# Patient Record
Sex: MALE | Race: WHITE | NOT HISPANIC OR LATINO
[De-identification: names, ages, dates, MRNs, and addresses within clinical notes are randomized per-mention and may not be internally consistent; named-entity substitution may affect disease eponyms.]

---

## 2020-01-23 ENCOUNTER — APPOINTMENT (OUTPATIENT)
Dept: UROLOGY | Facility: CLINIC | Age: 45
End: 2020-01-23
Payer: COMMERCIAL

## 2020-01-23 VITALS
TEMPERATURE: 98 F | DIASTOLIC BLOOD PRESSURE: 74 MMHG | RESPIRATION RATE: 16 BRPM | HEART RATE: 76 BPM | SYSTOLIC BLOOD PRESSURE: 124 MMHG

## 2020-01-23 PROCEDURE — 99205 OFFICE O/P NEW HI 60 MIN: CPT

## 2020-01-23 NOTE — REVIEW OF SYSTEMS
[Abdominal Pain] : abdominal pain [Constipation] : constipation [Loss of interest] : loss of interest in sexual activity [Poor quality erections] : Poor quality erections [Seen by urologist before (Name)  ___] : Preciously seen by a urologist: [unfilled] [Wake up at night to urinate  How many times?  ___] : wakes up to urinate [unfilled] times during the night [Strong urge to urinate] : strong urge to urinate [Anxiety] : anxiety [Slow urine stream] : slow urine stream [Depression] : depression [Negative] : Heme/Lymph

## 2020-01-24 LAB
APPEARANCE: CLEAR
BACTERIA: NEGATIVE
BILIRUBIN URINE: NEGATIVE
BLOOD URINE: NEGATIVE
COLOR: NORMAL
GLUCOSE QUALITATIVE U: NEGATIVE
HYALINE CASTS: 0 /LPF
KETONES URINE: NEGATIVE
LEUKOCYTE ESTERASE URINE: NEGATIVE
MICROSCOPIC-UA: NORMAL
NITRITE URINE: NEGATIVE
PH URINE: 6
PROTEIN URINE: NEGATIVE
RED BLOOD CELLS URINE: 1 /HPF
SPECIFIC GRAVITY URINE: 1.03
SQUAMOUS EPITHELIAL CELLS: 0 /HPF
UROBILINOGEN URINE: NORMAL
WHITE BLOOD CELLS URINE: 1 /HPF

## 2020-06-11 ENCOUNTER — APPOINTMENT (OUTPATIENT)
Dept: UROLOGY | Facility: CLINIC | Age: 45
End: 2020-06-11
Payer: COMMERCIAL

## 2020-06-11 VITALS — TEMPERATURE: 98.1 F

## 2020-06-11 DIAGNOSIS — N52.9 MALE ERECTILE DYSFUNCTION, UNSPECIFIED: ICD-10-CM

## 2020-06-11 DIAGNOSIS — N41.9 INFLAMMATORY DISEASE OF PROSTATE, UNSPECIFIED: ICD-10-CM

## 2020-06-11 DIAGNOSIS — R39.11 HESITANCY OF MICTURITION: ICD-10-CM

## 2020-06-11 PROCEDURE — 99215 OFFICE O/P EST HI 40 MIN: CPT

## 2020-06-12 LAB
APPEARANCE: CLEAR
BACTERIA: NEGATIVE
BILIRUBIN URINE: NEGATIVE
BLOOD URINE: NEGATIVE
COLOR: COLORLESS
GLUCOSE QUALITATIVE U: NEGATIVE
HYALINE CASTS: 0 /LPF
KETONES URINE: NEGATIVE
LEUKOCYTE ESTERASE URINE: NEGATIVE
MICROSCOPIC-UA: NORMAL
NITRITE URINE: NEGATIVE
PH URINE: 6
PROTEIN URINE: NEGATIVE
RED BLOOD CELLS URINE: 0 /HPF
SPECIFIC GRAVITY URINE: 1.01
SQUAMOUS EPITHELIAL CELLS: 0 /HPF
UROBILINOGEN URINE: NORMAL
WHITE BLOOD CELLS URINE: 0 /HPF

## 2020-06-15 LAB — BACTERIA UR CULT: NORMAL

## 2020-09-25 ENCOUNTER — APPOINTMENT (OUTPATIENT)
Dept: UROLOGY | Facility: CLINIC | Age: 45
End: 2020-09-25

## 2023-09-11 ENCOUNTER — APPOINTMENT (OUTPATIENT)
Dept: ORTHOPEDIC SURGERY | Facility: CLINIC | Age: 48
End: 2023-09-11
Payer: COMMERCIAL

## 2023-09-11 ENCOUNTER — NON-APPOINTMENT (OUTPATIENT)
Age: 48
End: 2023-09-11

## 2023-09-11 VITALS — HEIGHT: 69 IN | WEIGHT: 216 LBS | BODY MASS INDEX: 31.99 KG/M2

## 2023-09-11 DIAGNOSIS — S76.312A STRAIN OF MUSCLE, FASCIA AND TENDON OF THE POSTERIOR MUSCLE GROUP AT THIGH LEVEL, LEFT THIGH, INITIAL ENCOUNTER: ICD-10-CM

## 2023-09-11 DIAGNOSIS — S73.192A OTHER SPRAIN OF LEFT HIP, INITIAL ENCOUNTER: ICD-10-CM

## 2023-09-11 PROCEDURE — 73503 X-RAY EXAM HIP UNI 4/> VIEWS: CPT

## 2023-09-11 PROCEDURE — 99204 OFFICE O/P NEW MOD 45 MIN: CPT

## 2024-03-23 ENCOUNTER — NON-APPOINTMENT (OUTPATIENT)
Age: 49
End: 2024-03-23

## 2025-06-26 ENCOUNTER — EMERGENCY (EMERGENCY)
Facility: HOSPITAL | Age: 50
LOS: 1 days | End: 2025-06-26
Attending: EMERGENCY MEDICINE
Payer: COMMERCIAL

## 2025-06-26 VITALS
TEMPERATURE: 98 F | RESPIRATION RATE: 20 BRPM | WEIGHT: 199.96 LBS | SYSTOLIC BLOOD PRESSURE: 119 MMHG | HEART RATE: 75 BPM | OXYGEN SATURATION: 99 % | DIASTOLIC BLOOD PRESSURE: 70 MMHG | HEIGHT: 64 IN

## 2025-06-26 PROCEDURE — 99284 EMERGENCY DEPT VISIT MOD MDM: CPT | Mod: 25

## 2025-06-26 PROCEDURE — 96372 THER/PROPH/DIAG INJ SC/IM: CPT

## 2025-06-26 PROCEDURE — 99284 EMERGENCY DEPT VISIT MOD MDM: CPT

## 2025-06-26 RX ORDER — METHOCARBAMOL 500 MG/1
1500 TABLET, FILM COATED ORAL ONCE
Refills: 0 | Status: COMPLETED | OUTPATIENT
Start: 2025-06-26 | End: 2025-06-26

## 2025-06-26 RX ORDER — METHOCARBAMOL 500 MG/1
2 TABLET, FILM COATED ORAL
Qty: 18 | Refills: 0
Start: 2025-06-26 | End: 2025-06-28

## 2025-06-26 RX ORDER — IBUPROFEN 200 MG
1 TABLET ORAL
Qty: 20 | Refills: 0
Start: 2025-06-26 | End: 2025-06-30

## 2025-06-26 RX ORDER — OXYCODONE HYDROCHLORIDE 30 MG/1
1 TABLET ORAL
Qty: 3 | Refills: 0
Start: 2025-06-26 | End: 2025-06-28

## 2025-06-26 RX ORDER — DEXAMETHASONE 0.5 MG/1
10 TABLET ORAL ONCE
Refills: 0 | Status: COMPLETED | OUTPATIENT
Start: 2025-06-26 | End: 2025-06-26

## 2025-06-26 RX ORDER — LIDOCAINE HYDROCHLORIDE 20 MG/ML
1 JELLY TOPICAL ONCE
Refills: 0 | Status: COMPLETED | OUTPATIENT
Start: 2025-06-26 | End: 2025-06-26

## 2025-06-26 RX ORDER — KETOROLAC TROMETHAMINE 30 MG/ML
15 INJECTION, SOLUTION INTRAMUSCULAR; INTRAVENOUS ONCE
Refills: 0 | Status: DISCONTINUED | OUTPATIENT
Start: 2025-06-26 | End: 2025-06-26

## 2025-06-26 RX ORDER — OXYCODONE HYDROCHLORIDE 30 MG/1
5 TABLET ORAL ONCE
Refills: 0 | Status: DISCONTINUED | OUTPATIENT
Start: 2025-06-26 | End: 2025-06-26

## 2025-06-26 RX ADMIN — DEXAMETHASONE 10 MILLIGRAM(S): 0.5 TABLET ORAL at 19:42

## 2025-06-26 RX ADMIN — LIDOCAINE HYDROCHLORIDE 1 PATCH: 20 JELLY TOPICAL at 19:43

## 2025-06-26 RX ADMIN — KETOROLAC TROMETHAMINE 15 MILLIGRAM(S): 30 INJECTION, SOLUTION INTRAMUSCULAR; INTRAVENOUS at 19:43

## 2025-06-26 RX ADMIN — OXYCODONE HYDROCHLORIDE 5 MILLIGRAM(S): 30 TABLET ORAL at 20:55

## 2025-06-26 RX ADMIN — METHOCARBAMOL 1500 MILLIGRAM(S): 500 TABLET, FILM COATED ORAL at 19:43

## 2025-06-26 NOTE — ED PROVIDER NOTE - NSFOLLOWUPINSTRUCTIONS_ED_ALL_ED_FT
- Please bring all documentation from your ED visit to any related future follow up appointment.  - Please call to schedule follow up appointment with your primary care physician within 24-48 hours.  - Please seek immediate medical attention or return to the ED for any new/worsening, signs/symptoms, or concerns     Back Pain    Back pain is very common in adults. The cause of back pain is rarely dangerous and the pain often gets better over time. The cause of your back pain may not be known and may include strain of muscles or ligaments, degeneration of the spinal disks, or arthritis. Occasionally the pain may radiate down your leg(s). Over-the-counter medicines to reduce pain and inflammation are often the most helpful. Stretching and remaining active frequently helps the healing process.     SEEK IMMEDIATE MEDICAL CARE IF YOU HAVE ANY OF THE FOLLOWING SYMPTOMS: bowel or bladder control problems, unusual weakness or numbness in your arms or legs, nausea or vomiting, abdominal pain, fever, dizziness/lightheadedness.

## 2025-06-26 NOTE — ED ADULT TRIAGE NOTE - CHIEF COMPLAINT QUOTE
pt arrives to ED c/o lower back pain that radiates to his left leg, denies N/V/D/CP/SOB/fall/injury, Motrin with no relief, scheduled for injection on Tuesday

## 2025-06-26 NOTE — ED PROVIDER NOTE - OBJECTIVE STATEMENT
49yo M denies pmh presents to ED c/o L sided lower back pain that started  4d ago. pt reports pain progressed 2d ago after excessive walking during sons baseball game. pt reports pain worse with walking and standing from seated position. pt went to orthopedics who advised him that he has "SI joint inflammation" and scheduled him for joint injections this week. pt reports he took ibu today without much relief, last dose over 6h ago. pt notes numbness down his L thigh.

## 2025-06-26 NOTE — ED PROVIDER NOTE - PATIENT PORTAL LINK FT
You can access the FollowMyHealth Patient Portal offered by [facility] by registering at the following website: [email or web address]. By joining [facility]’s FollowMyHealth portal, you will also be able to view your health information using other applications (apps) compatible with our system.

## 2025-06-26 NOTE — ED ADULT NURSE NOTE - OBJECTIVE STATEMENT
Assumed care of patient in rw. Pt a&ox4 rr even and unlabored presents to ed with c/o left lower back pain radiating to L leg for a couple of days. Reports following up with his Orthopedic doctor for an injection on Tuesday. Denies saddle anesthesia, bowel/urinary incontinence, chest pain, sob, fever, chills. Pt educated on plan of care, pt able to successfully teach back plan of care to RN, RN will continue to reeducate pt during hospital stay.

## 2025-06-26 NOTE — ED ADULT NURSE NOTE - NSFALLRISKINTERV_ED_ALL_ED

## 2025-06-26 NOTE — ED PROVIDER NOTE - PHYSICAL EXAMINATION
General: non-toxic appearing, in some acute pain  CV: RRR, nl s1/s2.  Resp: CTAB, normal rate and effort  Neuro: sensorimotor intact without deficits   MSK: +L paraspinal ttp, Full ROM and strength ext x 4. ambulatory with discomfort  Skin: No rashes, lacerations, abrasions, ecchymosis, edema. intact and perfused.

## 2025-06-26 NOTE — ED PROVIDER NOTE - ATTENDING APP SHARED VISIT CONTRIBUTION OF CARE
49yo M p/w positional lower back pain with numbness to his L thigh x4d. following with orthopedics, scheduled for joint injections this week. on eval pt appeared well, in some acute pain, +L Paraspinal ttp, +SI joint ttp, ambulatory with discomfort, no focal deficits, FROM and strength of lower ext b/l, remainder of PE WNL. VSS. presentation c/w muscle strain. ordered decadron, toradol, robaxin and lidocaine patch. will reassess and send meds to pharm.      I, Cory Watts, performed the initial face to face bedside interview with this patient regarding history of present illness, review of symptoms and relevant past medical, social and family history.  I completed an independent physical examination.  I was the initial provider who evaluated this patient. I have signed out the follow up of any pending tests (i.e. labs, radiological studies) to the ACP.  I have communicated the patient’s plan of care and disposition with the ACP.

## 2025-06-26 NOTE — ED PROVIDER NOTE - CLINICAL SUMMARY MEDICAL DECISION MAKING FREE TEXT BOX
49yo M p/w positional lower back pain with numbness to his L thigh x4d. following with orthopedics, scheduled for joint injections this week. on eval pt appeared well, in some acute pain, +L Paraspinal ttp, +SI joint ttp, ambulatory with discomfort, no focal deficits, FROM and strength of lower ext b/l, remainder of PE WNL. VSS. presentation c/w muscle strain. ordered decadron, toradol, robaxin and lidocaine patch. will reassess and send meds to pharm.     discussed supportive care measures and return precautions. advised to f.u with pcp and ortho. pt verbalized understanding and agreement 51yo M p/w positional lower back pain with numbness to his L thigh x4d. following with orthopedics, scheduled for joint injections this week. on eval pt appeared well, in some acute pain, +L Paraspinal ttp, +SI joint ttp, ambulatory with discomfort, no focal deficits, FROM and strength of lower ext b/l, remainder of PE WNL. VSS. presentation c/w muscle strain. ordered decadron, toradol, robaxin and lidocaine patch. will reassess and send meds to pharm.     pt reported some improvement of sx. ambulatory in ED. discussed supportive care measures and return precautions. advised to f.u with pcp and ortho. pt verbalized understanding and agreement

## 2025-06-27 ENCOUNTER — EMERGENCY (EMERGENCY)
Facility: HOSPITAL | Age: 50
LOS: 1 days | End: 2025-06-27
Attending: STUDENT IN AN ORGANIZED HEALTH CARE EDUCATION/TRAINING PROGRAM
Payer: COMMERCIAL

## 2025-06-27 VITALS
TEMPERATURE: 98 F | HEART RATE: 74 BPM | HEIGHT: 64 IN | OXYGEN SATURATION: 98 % | SYSTOLIC BLOOD PRESSURE: 112 MMHG | RESPIRATION RATE: 18 BRPM | DIASTOLIC BLOOD PRESSURE: 69 MMHG | WEIGHT: 199.96 LBS

## 2025-06-27 VITALS
TEMPERATURE: 98 F | DIASTOLIC BLOOD PRESSURE: 63 MMHG | RESPIRATION RATE: 20 BRPM | OXYGEN SATURATION: 99 % | SYSTOLIC BLOOD PRESSURE: 113 MMHG | HEART RATE: 60 BPM

## 2025-06-27 PROCEDURE — 96376 TX/PRO/DX INJ SAME DRUG ADON: CPT

## 2025-06-27 PROCEDURE — 72148 MRI LUMBAR SPINE W/O DYE: CPT

## 2025-06-27 PROCEDURE — 72148 MRI LUMBAR SPINE W/O DYE: CPT | Mod: 26

## 2025-06-27 PROCEDURE — 96375 TX/PRO/DX INJ NEW DRUG ADDON: CPT

## 2025-06-27 PROCEDURE — 99284 EMERGENCY DEPT VISIT MOD MDM: CPT | Mod: 25

## 2025-06-27 PROCEDURE — 96374 THER/PROPH/DIAG INJ IV PUSH: CPT

## 2025-06-27 PROCEDURE — 99285 EMERGENCY DEPT VISIT HI MDM: CPT

## 2025-06-27 RX ORDER — OXYCODONE HYDROCHLORIDE AND ACETAMINOPHEN 10; 325 MG/1; MG/1
1 TABLET ORAL
Qty: 16 | Refills: 0
Start: 2025-06-27

## 2025-06-27 RX ORDER — OXYCODONE HYDROCHLORIDE 30 MG/1
10 TABLET ORAL ONCE
Refills: 0 | Status: DISCONTINUED | OUTPATIENT
Start: 2025-06-27 | End: 2025-06-27

## 2025-06-27 RX ORDER — CYCLOBENZAPRINE HYDROCHLORIDE 15 MG/1
10 CAPSULE, EXTENDED RELEASE ORAL ONCE
Refills: 0 | Status: COMPLETED | OUTPATIENT
Start: 2025-06-27 | End: 2025-06-27

## 2025-06-27 RX ORDER — KETOROLAC TROMETHAMINE 30 MG/ML
30 INJECTION, SOLUTION INTRAMUSCULAR; INTRAVENOUS ONCE
Refills: 0 | Status: DISCONTINUED | OUTPATIENT
Start: 2025-06-27 | End: 2025-06-27

## 2025-06-27 RX ORDER — LIDOCAINE HYDROCHLORIDE 20 MG/ML
1 JELLY TOPICAL ONCE
Refills: 0 | Status: COMPLETED | OUTPATIENT
Start: 2025-06-27 | End: 2025-06-27

## 2025-06-27 RX ADMIN — LIDOCAINE HYDROCHLORIDE 1 PATCH: 20 JELLY TOPICAL at 14:06

## 2025-06-27 RX ADMIN — Medication 4 MILLIGRAM(S): at 14:07

## 2025-06-27 RX ADMIN — KETOROLAC TROMETHAMINE 30 MILLIGRAM(S): 30 INJECTION, SOLUTION INTRAMUSCULAR; INTRAVENOUS at 14:06

## 2025-06-27 RX ADMIN — Medication 4 MILLIGRAM(S): at 15:50

## 2025-06-27 RX ADMIN — CYCLOBENZAPRINE HYDROCHLORIDE 10 MILLIGRAM(S): 15 CAPSULE, EXTENDED RELEASE ORAL at 14:06

## 2025-06-27 NOTE — ED ADULT NURSE NOTE - NS ED NURSE RECORD ANOTHER VITAL SIGN
Good mornign! Teodoro's labs are overall great!  His blood counts, thyroid, blood sugar, lipid (cholesterol) levels.  No concerns for anemia.     He had slightly low Vitamin D levels.  I recommend taking Vitamin D 2000 units every day. We can recheck his levels in 3-6 months to see how it is.  It is only barely low so I imagine it will improve significantly. Yes

## 2025-06-27 NOTE — ED PROVIDER NOTE - CLINICAL SUMMARY MEDICAL DECISION MAKING FREE TEXT BOX
mri reviewed. neurosurgery consult appredciated. PT already has steroids at home.  Pt feeling better after second dose of morphine.  Pt reassured and instructed to f/up with his SHERMAN as scheduled for tuesday and subsequently see dr. mg.  pt instructed to return for worsening pain, bowel/bladder dysfunction. weakness, or any other concerns.  Pt given a copy of all results and instructed to f/up with pcp regarding any abnormal results.

## 2025-06-27 NOTE — CONSULT NOTE ADULT - ASSESSMENT
HPI: Pt is a 50 yoM with no known PMHX  returns with complaints of lower back pain. He presented to ED just yesterday with the same complaints which started several days ago. Pt states that he followed up with an orthopedist who arranged for him to have an SHERMAN on July 1st. Pt came last night for worsening back pain and was given oxycodone, steroids, nsaids, muscle relaxant and discharged. Pt states that today he took Motrin and oxycodone without relief. In ED MRI shows  advanced degenerative disc disease at T11-T12, L3-L4, L4-L5   and L5-S1, Neurosuregery consulted for further recommendations. Patient seen and examined in no acute distress,  Pt states pt reports pain worse with walking and standing from seated position. He denies weakness in his legs, no bowel/bladder dysfunction. no fever, no recent trauma or fall.      pain control  steroids   patient scheduled for tomorrow EMJ tomorrow and SHERMAN 7/1  f/u with primary Pain Management   no further sx intervention at the moment   INCOMPLETE*******      Case to be d/w Dr. 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 Velasquez 2005) returns with complaints of lower back stiffness, left groin and quad pain with intermittent left knee/thigh numbness tingling.   Strong on exam and intact except decreased sensation on left calf.    Plan:   -pain control  -medrol dose janet   -patient scheduled for tomorrow EMG tomorrow and SHERMAN with Dr. Villasenor 7/1  -f/u with primary Pain Management doctor  -no neurosurgical intervention at this time  -f/u neurosurgeon (Dr. Velasquez) after SHERMAN    Case to be d/w Dr. Burgess and Dr. 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 Velasquez 2005) returns with complaints of lower back stiffness, left groin and quad pain with intermittent left knee/thigh numbness tingling.   Strong on exam and intact except decreased sensation on left calf.    Plan:   -pain control  -medrol dose janet   -patient scheduled for tomorrow EMG tomorrow and SHERMAN with Dr. Villasenor 7/1  -f/u with primary Pain Management doctor  -no neurosurgical intervention at this time  -f/u neurosurgeon (Dr. Velasquez) after SHERMAN, signing off. Recall as needed    Case to be d/w Dr. Burgess and Dr. 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 Velasquez 2005) returns with complaints of lower back stiffness, left groin and quad pain with intermittent left knee/thigh numbness tingling.   Strong on exam and intact except decreased sensation on left calf.    Plan:   -pain control  -medrol dose janet   -patient scheduled for tomorrow EMG tomorrow and SHERMAN with Dr. Villasenor 7/1  -f/u with primary Pain Management doctor  -no neurosurgical intervention at this time  -f/u neurosurgeon (Dr. Velasquez or Dr. Burgess) after SHERMAN, signing off. Recall as needed    Case to be d/w Dr. Burgess and Dr. Velasquez

## 2025-06-27 NOTE — ED PROVIDER NOTE - OBJECTIVE STATEMENT
Pt is a 50 yoi M co low back pain.  Pt was here last night for the same. Pt states that the pain started several days ago.  Pt states that he followed up with an orthopedist who arranged for him to have an SHERMAN on july 1st. Pt came last night for worsening back pain and was given oxycodone, steroids, nsaids, muscle relaxant and discharged. Pt states that today he took motrin and oxycodone without relief.  Pt states that the numbness to his L thigh is intermittent. no weakness in his legs, just pain into his L leg.  no bowel/bladder dysfunction. no fever.

## 2025-06-27 NOTE — ED PROVIDER NOTE - PHYSICAL EXAMINATION
Constitutional - well-developed.   Head - NCAT. Airway patent.   Eyes - PERRL.   CV - RRR. no murmur. no edema.   Pulm - CTAB.   Abd - soft, nt. no rebound. no guarding.   Neuro - A&Ox3. strength 5/5 x4. sensation intact x4.   Skin - No rash. .  MSK - normal ROM.

## 2025-06-27 NOTE — ED ADULT NURSE NOTE - NSFALLRISKINTERV_ED_ALL_ED

## 2025-06-27 NOTE — ED PROVIDER NOTE - NS ED MD DISPO DISCHARGE CCDA
Stress test and echo scheduled 4/11/2024. Postponing until 4/12/2024 to follow up and forward update to Fidel Ray NP, prior to procedure. Patient/Caregiver provided printed discharge information.

## 2025-06-27 NOTE — ED ADULT TRIAGE NOTE - CHIEF COMPLAINT QUOTE
Pt BIBA from home, seen in ED last night for same, c/o 10/10 back/groin pain radiating down left leg, states PO meds prescribed not helping with pain, given 100mcg fentanyl IV and 15mg toradol PTA

## 2025-06-27 NOTE — CONSULT NOTE ADULT - SUBJECTIVE AND OBJECTIVE BOX
The patient is a 50y Male complaining of back pain general.    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 Velasquez 2005)  returns with complaints of lower back pain. He presented to ED just yesterday with the same complaints which started several days ago. Pt states that he followed up with an orthopedist who arranged for him to have an SHERMAN on July 1st. Pt came last night for worsening back pain and was given oxycodone, steroids, nsaids, muscle relaxant and discharged. Pt states that today he took Motrin and oxycodone without relief. In ED MRI shows advanced degenerative disc disease at T11-T12, L3-L4, L4-L5 L5-S1, patient was given Morphine 4 mg IVP and Toradol injection and reports mild improvement. Neurosurgery consulted for further recommendations. Patient seen and examined in no acute distress,  Pt states pt reports pain worse with walking and standing from seated position with numbness and tingling in his left knees. He denies weakness in his legs, no bowel/bladder dysfunction. no fever, no recent trauma or fall.       PAST MEDICAL & SURGICAL HISTORY:    FAMILY HISTORY:      SOCIAL HISTORY:  Tobacco Use:  EtOH use:   Substance:    Allergies    No Known Allergies    Intolerances        REVIEW OF SYSTEMS  Negative except as noted in HPI  CONSTITUTIONAL: No fever, weight loss, or fatigue  EYES: No eye pain, visual disturbances, or discharge  ENMT:  No difficulty hearing, tinnitus, vertigo; No sinus or throat pain  NECK: No pain or stiffness  RESPIRATORY: No cough, wheezing, chills or hemoptysis; No shortness of breath  CARDIOVASCULAR: No chest pain, palpitations, dizziness, or leg swelling  GASTROINTESTINAL: No abdominal or epigastric pain. No nausea, vomiting, or hematemesis; No diarrhea or constipation. No melena or hematochezia.  GENITOURINARY: No dysuria, frequency, hematuria, or incontinence  NEUROLOGICAL: No headaches, memory loss, loss of strength, numbness, or tremors  MUSCULOSKELETAL: No joint pain or swelling; No muscle, back, or extremity pain    HOME MEDICATIONS:  Home Medications:      MEDICATIONS:  Antibiotics:    Neuro:    Anticoagulation:    OTHER:      Vital Signs Last 24 Hrs  T(C): 36.4 (27 Jun 2025 13:07), Max: 36.4 (26 Jun 2025 17:55)  T(F): 97.6 (27 Jun 2025 13:07), Max: 97.6 (27 Jun 2025 13:07)  HR: 74 (27 Jun 2025 13:07) (74 - 75)  BP: 112/69 (27 Jun 2025 13:07) (112/69 - 119/70)  BP(mean): --  RR: 18 (27 Jun 2025 13:07) (18 - 20)  SpO2: 98% (27 Jun 2025 13:07) (98% - 99%)    Parameters below as of 27 Jun 2025 13:07  Patient On (Oxygen Delivery Method): room air          Physical Exam:  Constitutional: NAD, lying in bed  Neuro  * Mental Status:  GCS 15: Awake, alert, oriented to conversation. No aphasia or difficulty speaking. No dysarthria.  * Cranial Nerves: Cnii-Cnxii grossly intact. PERRL, EOMI, tongue midline, no gaze deviation  * Motor: RUE 5/5, LUE 5/5, RLE 5/5, LLE 5/5, no drift or dysmetria  * Sensory: Sensation intact to light touch  * Reflexes: not assessed   Cardiovascular: Regular rate and rhythm.  Eyes: See neurologic examination with detailed examination of eyes.  ENT: No JVD, Trachea Midline  Respiratory: non labored breathing   Gastrointestinal: Soft, nontender, nondistended.  Genitourinary: [ ] Salas, [ x ] No Salas.   Musculoskeletal: No muscle wasting noted, (See neurologic assessment for full muscle strength assessment) No pretibial edema appreciated, no appreciable calf tenderness.  Skin:  no wounds, no redness, no abrasions noted  Hematologic / Lymph / Immunologic: No bleeding from IV sites or wounds, No lymphadenopathy, No Hives or allergic type skin lesions    LABS:      RADIOLOGY, EKG & ADDITIONAL TESTS: Reviewed.     MR Lumbar Spine No Cont (06.27.25 @ 16:13) >  IMPRESSION:  Advanceddegenerative disc disease at T11-T12, L3-L4, L4-L5   and L5-S1     NEUROSURGERY PA CONSULT NOTE  The patient is a 50y Male complaining of back pain general.    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 Velasquez 2005)  returns with complaints of lower back pain. He presented to ED just yesterday with the same complaints which started several days ago. Pt states that he followed up with an orthopedist who arranged for him to have an SHERMAN on July 1st. Pt came last night for worsening back pain and was given oxycodone, steroids, nsaids, muscle relaxant and discharged. Pt states that today he took Motrin and oxycodone without relief. In ED MRI shows advanced degenerative disc disease at T11-T12, L3-L4, L4-L5 L5-S1, patient was given Morphine 4 mg IVP and Toradol injection and reports mild improvement. Neurosurgery consulted for further recommendations. Patient seen and examined in no acute distress,  Pt states pt reports pain worse with walking and standing from seated position with numbness and tingling in his left knees. He denies weakness in his legs, no bowel/bladder dysfunction. no fever, no recent trauma or fall.       PAST MEDICAL & SURGICAL HISTORY:  hx of C6-7 ACDF Dr. Rodríguez 2003  C5-6foraminotomy with Dr. Velasquez in 2005      SOCIAL HISTORY:  Tobacco Use: denies  EtOH use: 1 drink a week  Substance: denies    Allergies  No Known Allergies  Intolerances    REVIEW OF SYSTEMS  Negative except as noted in HPI  CONSTITUTIONAL: No fever, weight loss, or fatigue  EYES: No eye pain, visual disturbances, or discharge  ENMT:  No difficulty hearing, tinnitus, vertigo; No sinus or throat pain  NECK: No pain or stiffness  RESPIRATORY: No cough, wheezing, chills or hemoptysis; No shortness of breath  CARDIOVASCULAR: No chest pain, palpitations, dizziness, or leg swelling  GASTROINTESTINAL: No abdominal or epigastric pain. No nausea, vomiting, or hematemesis; No diarrhea or constipation. No melena or hematochezia.  GENITOURINARY: No dysuria, frequency, hematuria, or incontinence  NEUROLOGICAL: No headaches, memory loss, loss of strength, numbness, or tremors  MUSCULOSKELETAL: No joint pain or swelling; No muscle, back, or extremity pain    Vital Signs Last 24 Hrs  T(C): 36.4 (27 Jun 2025 13:07), Max: 36.4 (26 Jun 2025 17:55)  T(F): 97.6 (27 Jun 2025 13:07), Max: 97.6 (27 Jun 2025 13:07)  HR: 74 (27 Jun 2025 13:07) (74 - 75)  BP: 112/69 (27 Jun 2025 13:07) (112/69 - 119/70)  BP(mean): --  RR: 18 (27 Jun 2025 13:07) (18 - 20)  SpO2: 98% (27 Jun 2025 13:07) (98% - 99%)  Parameters below as of 27 Jun 2025 13:07  Patient On (Oxygen Delivery Method): room air    Physical Exam:  Constitutional: NAD, lying in bed  Neuro  * Mental Status:  GCS 15: Awake, alert, oriented to conversation. No aphasia or difficulty speaking. No dysarthria.  * Cranial Nerves: Cnii-Cnxii grossly intact. PERRL, EOMI, tongue midline, no gaze deviation  * Motor: RUE 5/5, LUE 5/5, RLE 5/5, LLE 5/5, no drift or dysmetria  * Sensory: Sensation intact to light touch  * Reflexes: not assessed   Cardiovascular: Regular rate and rhythm.  Eyes: See neurologic examination with detailed examination of eyes.  ENT: No JVD, Trachea Midline  Respiratory: non labored breathing   Gastrointestinal: Soft, nontender, nondistended.  Genitourinary: [ ] Salas, [ x ] No Salas.   Musculoskeletal: No muscle wasting noted, (See neurologic assessment for full muscle strength assessment) No pretibial edema appreciated, no appreciable calf tenderness.  Skin:  no wounds, no redness, no abrasions noted  Hematologic / Lymph / Immunologic: No bleeding from IV sites or wounds, No lymphadenopathy, No Hives or allergic type skin lesions    LABS:      RADIOLOGY, EKG & ADDITIONAL TESTS: Reviewed.     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 Velasquez 2005)  returns with complaints of lower back pain/stiffness. Pt has been following pain management (Dr. Villasenor, last seen this Wednesday). Has been getting medications, and PT but experienced worsened pain since yesterday which brought him to the ED on the 26th. Denies trauma or inciting event. He received pain medication, steroid shot and sent home. Pain returned and represented to ED today. Described left sided back stiffness, left groin and quad pain with intermittent numbness tingling in left knee area. Left leg and UEs without complaints. Pain worse with walking, feels like his left leg mina at times. Denies saddle anesthesia, change in bowel or bladder habits.    Of note: pt has hx of increased urinary frequency, issues with urine stream, and erectile dysfunction for 1 year. Saw urologist (Dr. Aiken) on Thursday, who told pt that urinary symptoms were related to SI joint issues. Bladder issues have not changed since the pain has increased.    *Pt has EMG study tomorrow already scheduled and an SHERMAN with Dr. Villasenor on Tuesday     PAST MEDICAL & SURGICAL HISTORY:  hx of C6-7 ACDF Dr. Rodríguez 2003  C5-6foraminotomy with Dr. Velasquez in 2005    SOCIAL HISTORY:  Tobacco Use: denies  EtOH use: 1 drink a week  Substance: denies    Allergies  No Known Allergies  Intolerances    Vital Signs Last 24 Hrs  T(C): 36.4 (27 Jun 2025 13:07), Max: 36.4 (26 Jun 2025 17:55)  T(F): 97.6 (27 Jun 2025 13:07), Max: 97.6 (27 Jun 2025 13:07)  HR: 74 (27 Jun 2025 13:07) (74 - 75)  BP: 112/69 (27 Jun 2025 13:07) (112/69 - 119/70)  BP(mean): --  RR: 18 (27 Jun 2025 13:07) (18 - 20)  SpO2: 98% (27 Jun 2025 13:07) (98% - 99%)  Parameters below as of 27 Jun 2025 13:07  Patient On (Oxygen Delivery Method): room air    Physical Exam:  Constitutional: NAD, lying in bed  Neuro  * Mental Status:  GCS 15: Awake, alert, oriented to conversation. No aphasia or difficulty speaking. No dysarthria.  * Cranial Nerves: Cnii-Cnxii grossly intact. PERRL, EOMI, tongue midline, no gaze deviation  * Motor: RUE 5/5, LUE 5/5, RLE 5/5, LLE 5/5  * Sensory: Sensation intact to light touch except subjective decreased sensation to left medial calf and shin.   * Reflexes: neg clonus, neg hoffmans reflex    LABS:  noen to review    RADIOLOGY, EKG & ADDITIONAL TESTS: Reviewed.   MR Lumbar Spine No Cont (06.27.25 @ 16:13) >  IMPRESSION:  Advanceddegenerative disc disease at T11-T12, L3-L4, L4-L5   and L5-S1

## 2025-06-27 NOTE — ED ADULT NURSE REASSESSMENT NOTE - NS ED NURSE REASSESS COMMENT FT1
patient pending Neurology .
pain meds given, pt resting comfortably showing no signs of respiratory distress or pain, pt is calm and cooperative

## 2025-06-27 NOTE — ED PROVIDER NOTE - PATIENT PORTAL LINK FT
You can access the FollowMyHealth Patient Portal offered by [facility] by registering at the following website: [email or web address]. By joining [facility]’s FollowMyHealth portal, you will also be able to view your health information using other applications (apps) compatible with our system.

## 2025-06-27 NOTE — ED ADULT NURSE NOTE - OBJECTIVE STATEMENT
Assumed care of pt at 1359. Pt A&Ox4 c/o back pain that radiates to his legs, was here yesterday for same complaint, denies CP/SOB, pt showing no signs of respiratory distress, pt is calm and cooperative

## 2025-07-01 ENCOUNTER — EMERGENCY (EMERGENCY)
Facility: HOSPITAL | Age: 50
LOS: 1 days | End: 2025-07-01
Attending: EMERGENCY MEDICINE
Payer: COMMERCIAL

## 2025-07-01 VITALS
DIASTOLIC BLOOD PRESSURE: 73 MMHG | HEART RATE: 76 BPM | TEMPERATURE: 98 F | SYSTOLIC BLOOD PRESSURE: 122 MMHG | WEIGHT: 199.96 LBS | OXYGEN SATURATION: 98 % | HEIGHT: 64 IN | RESPIRATION RATE: 18 BRPM

## 2025-07-01 LAB
ALBUMIN SERPL ELPH-MCNC: 3.8 G/DL — SIGNIFICANT CHANGE UP (ref 3.3–5.2)
ALP SERPL-CCNC: 54 U/L — SIGNIFICANT CHANGE UP (ref 40–120)
ALT FLD-CCNC: 20 U/L — SIGNIFICANT CHANGE UP
ANION GAP SERPL CALC-SCNC: 7 MMOL/L — SIGNIFICANT CHANGE UP (ref 5–17)
AST SERPL-CCNC: 19 U/L — SIGNIFICANT CHANGE UP
BASOPHILS # BLD AUTO: 0.05 K/UL — SIGNIFICANT CHANGE UP (ref 0–0.2)
BASOPHILS NFR BLD AUTO: 0.6 % — SIGNIFICANT CHANGE UP (ref 0–2)
BILIRUB SERPL-MCNC: 0.3 MG/DL — LOW (ref 0.4–2)
BUN SERPL-MCNC: 25.3 MG/DL — HIGH (ref 8–20)
CALCIUM SERPL-MCNC: 8.8 MG/DL — SIGNIFICANT CHANGE UP (ref 8.4–10.5)
CHLORIDE SERPL-SCNC: 105 MMOL/L — SIGNIFICANT CHANGE UP (ref 96–108)
CO2 SERPL-SCNC: 24 MMOL/L — SIGNIFICANT CHANGE UP (ref 22–29)
CREAT SERPL-MCNC: 0.84 MG/DL — SIGNIFICANT CHANGE UP (ref 0.5–1.3)
EGFR: 107 ML/MIN/1.73M2 — SIGNIFICANT CHANGE UP
EGFR: 107 ML/MIN/1.73M2 — SIGNIFICANT CHANGE UP
EOSINOPHIL # BLD AUTO: 0.11 K/UL — SIGNIFICANT CHANGE UP (ref 0–0.5)
EOSINOPHIL NFR BLD AUTO: 1.2 % — SIGNIFICANT CHANGE UP (ref 0–6)
GLUCOSE SERPL-MCNC: 100 MG/DL — HIGH (ref 70–99)
HCT VFR BLD CALC: 42.3 % — SIGNIFICANT CHANGE UP (ref 39–50)
HGB BLD-MCNC: 14 G/DL — SIGNIFICANT CHANGE UP (ref 13–17)
IMM GRANULOCYTES # BLD AUTO: 0.06 K/UL — SIGNIFICANT CHANGE UP (ref 0–0.07)
IMM GRANULOCYTES NFR BLD AUTO: 0.7 % — SIGNIFICANT CHANGE UP (ref 0–0.9)
LYMPHOCYTES # BLD AUTO: 1.55 K/UL — SIGNIFICANT CHANGE UP (ref 1–3.3)
LYMPHOCYTES NFR BLD AUTO: 17.5 % — SIGNIFICANT CHANGE UP (ref 13–44)
MCHC RBC-ENTMCNC: 28.4 PG — SIGNIFICANT CHANGE UP (ref 27–34)
MCHC RBC-ENTMCNC: 33.1 G/DL — SIGNIFICANT CHANGE UP (ref 32–36)
MCV RBC AUTO: 85.8 FL — SIGNIFICANT CHANGE UP (ref 80–100)
MONOCYTES # BLD AUTO: 0.66 K/UL — SIGNIFICANT CHANGE UP (ref 0–0.9)
MONOCYTES NFR BLD AUTO: 7.4 % — SIGNIFICANT CHANGE UP (ref 2–14)
NEUTROPHILS # BLD AUTO: 6.43 K/UL — SIGNIFICANT CHANGE UP (ref 1.8–7.4)
NEUTROPHILS NFR BLD AUTO: 72.6 % — SIGNIFICANT CHANGE UP (ref 43–77)
NRBC # BLD AUTO: 0 K/UL — SIGNIFICANT CHANGE UP (ref 0–0)
NRBC # FLD: 0 K/UL — SIGNIFICANT CHANGE UP (ref 0–0)
NRBC BLD AUTO-RTO: 0 /100 WBCS — SIGNIFICANT CHANGE UP (ref 0–0)
PLATELET # BLD AUTO: 227 K/UL — SIGNIFICANT CHANGE UP (ref 150–400)
PMV BLD: 10 FL — SIGNIFICANT CHANGE UP (ref 7–13)
POTASSIUM SERPL-MCNC: 4.3 MMOL/L — SIGNIFICANT CHANGE UP (ref 3.5–5.3)
POTASSIUM SERPL-SCNC: 4.3 MMOL/L — SIGNIFICANT CHANGE UP (ref 3.5–5.3)
PROT SERPL-MCNC: 6.5 G/DL — LOW (ref 6.6–8.7)
RBC # BLD: 4.93 M/UL — SIGNIFICANT CHANGE UP (ref 4.2–5.8)
RBC # FLD: 13 % — SIGNIFICANT CHANGE UP (ref 10.3–14.5)
SODIUM SERPL-SCNC: 136 MMOL/L — SIGNIFICANT CHANGE UP (ref 135–145)
WBC # BLD: 8.86 K/UL — SIGNIFICANT CHANGE UP (ref 3.8–10.5)
WBC # FLD AUTO: 8.86 K/UL — SIGNIFICANT CHANGE UP (ref 3.8–10.5)

## 2025-07-01 PROCEDURE — 36415 COLL VENOUS BLD VENIPUNCTURE: CPT

## 2025-07-01 PROCEDURE — 85025 COMPLETE CBC W/AUTO DIFF WBC: CPT

## 2025-07-01 PROCEDURE — 99223 1ST HOSP IP/OBS HIGH 75: CPT

## 2025-07-01 PROCEDURE — 99254 IP/OBS CNSLTJ NEW/EST MOD 60: CPT

## 2025-07-01 PROCEDURE — 80053 COMPREHEN METABOLIC PANEL: CPT

## 2025-07-01 RX ORDER — METHOCARBAMOL 500 MG/1
1500 TABLET, FILM COATED ORAL THREE TIMES A DAY
Refills: 0 | Status: DISCONTINUED | OUTPATIENT
Start: 2025-07-01 | End: 2025-07-08

## 2025-07-01 RX ORDER — DEXAMETHASONE 0.5 MG/1
10 TABLET ORAL ONCE
Refills: 0 | Status: COMPLETED | OUTPATIENT
Start: 2025-07-01 | End: 2025-07-01

## 2025-07-01 RX ORDER — KETOROLAC TROMETHAMINE 30 MG/ML
30 INJECTION, SOLUTION INTRAMUSCULAR; INTRAVENOUS ONCE
Refills: 0 | Status: DISCONTINUED | OUTPATIENT
Start: 2025-07-01 | End: 2025-07-01

## 2025-07-01 RX ORDER — METHOCARBAMOL 500 MG/1
1500 TABLET, FILM COATED ORAL ONCE
Refills: 0 | Status: COMPLETED | OUTPATIENT
Start: 2025-07-01 | End: 2025-07-01

## 2025-07-01 RX ORDER — IBUPROFEN 200 MG
600 TABLET ORAL EVERY 6 HOURS
Refills: 0 | Status: DISCONTINUED | OUTPATIENT
Start: 2025-07-01 | End: 2025-07-08

## 2025-07-01 RX ORDER — ACETAMINOPHEN 500 MG/5ML
1000 LIQUID (ML) ORAL ONCE
Refills: 0 | Status: COMPLETED | OUTPATIENT
Start: 2025-07-01 | End: 2025-07-01

## 2025-07-01 RX ORDER — OXYCODONE HYDROCHLORIDE 30 MG/1
5 TABLET ORAL
Refills: 0 | Status: COMPLETED | OUTPATIENT
Start: 2025-07-01 | End: 2025-07-08

## 2025-07-01 RX ORDER — ACETAMINOPHEN 500 MG/5ML
975 LIQUID (ML) ORAL EVERY 6 HOURS
Refills: 0 | Status: DISCONTINUED | OUTPATIENT
Start: 2025-07-01 | End: 2025-07-08

## 2025-07-01 RX ADMIN — KETOROLAC TROMETHAMINE 30 MILLIGRAM(S): 30 INJECTION, SOLUTION INTRAMUSCULAR; INTRAVENOUS at 12:18

## 2025-07-01 RX ADMIN — DEXAMETHASONE 102 MILLIGRAM(S): 0.5 TABLET ORAL at 21:50

## 2025-07-01 RX ADMIN — METHOCARBAMOL 1500 MILLIGRAM(S): 500 TABLET, FILM COATED ORAL at 21:43

## 2025-07-01 RX ADMIN — Medication 600 MILLIGRAM(S): at 20:31

## 2025-07-01 RX ADMIN — METHOCARBAMOL 1500 MILLIGRAM(S): 500 TABLET, FILM COATED ORAL at 12:19

## 2025-07-01 RX ADMIN — Medication 4 MILLIGRAM(S): at 12:19

## 2025-07-01 RX ADMIN — OXYCODONE HYDROCHLORIDE 5 MILLIGRAM(S): 30 TABLET ORAL at 20:31

## 2025-07-01 RX ADMIN — Medication 400 MILLIGRAM(S): at 12:19

## 2025-07-01 RX ADMIN — Medication 975 MILLIGRAM(S): at 20:32

## 2025-07-01 NOTE — PROGRESS NOTE ADULT - SUBJECTIVE AND OBJECTIVE BOX
Patient is a 50y old  Male who presents with a chief complaint of     HPI:      + - LOC   + - trauma   denied Headache   pt c/o + -headache  /10 on the pain scale   + - Nausea /+ - Vomiting  Right Left hand dominant   admits denies new/ worsening weakness  admits denies new/ worsening paresthesias/ numbness   admits denies  new/ worsening visual changes  admits denies C- Spine pain, + - collar/ cleared by trauma   admits denies T/LS  Spine pain, + - brace   admits denies Bowel/ Bladder dysfunction           PAST MEDICAL & SURGICAL HISTORY:    SOCIAL HISTORY:     FAMILY HISTORY: non-pertinent at this time        ROS:  CONSTITUTIONAL: No fever, weight loss, or fatigue  EYES: No eye pain, visual disturbances, or discharge  ENMT:  No difficulty hearing, tinnitus, vertigo; No sinus or throat pain  NECK: No pain or stiffness  RESPIRATORY: No cough, wheezing, chills or hemoptysis; No shortness of breath  CARDIOVASCULAR: No chest pain, palpitations, dizziness, or leg swelling  GASTROINTESTINAL: No abdominal or epigastric pain. No nausea, vomiting, or hematemesis; No diarrhea or constipation. No melena or hematochezia.  GENITOURINARY: No dysuria, frequency, hematuria, or incontinence  NEUROLOGICAL: No headaches, memory loss, loss of strength, numbness, or tremors  SKIN: No itching, burning, rashes, or lesions   MUSCULOSKELETAL: No joint pain or swelling; No muscle, back, or extremity pain        MEDICATIONS  (STANDING):    MEDICATIONS  (PRN):    Allergies  No Known Allergies    Intolerances      Vital Signs Last 24 Hrs  T(C): 36.7 (01 Jul 2025 10:29), Max: 36.7 (01 Jul 2025 10:29)  T(F): 98 (01 Jul 2025 10:29), Max: 98 (01 Jul 2025 10:29)  HR: 76 (01 Jul 2025 10:29) (76 - 76)  BP: 122/73 (01 Jul 2025 10:29) (122/73 - 122/73)  BP(mean): --  RR: 18 (01 Jul 2025 10:29) (18 - 18)  SpO2: 98% (01 Jul 2025 10:29) (98% - 98%)    Parameters below as of 01 Jul 2025 10:29  Patient On (Oxygen Delivery Method): room air        PHYSICAL EXAM:  GENERAL: NAD, well-groomed, well-developed, AAOx3 and very cooperative  HEAD:  Atraumatic, Normocephalic, no palpable step-off appreciated on palpation  EYES: b/l EOMI, PERRL, conjunctiva and sclera clear,   NECK: Supple, nontender to palpation  + FROM w no muscular soreness reported, neg B/B/K signs.  TS/LS: nontender to palpation midline or paraspinal muscles b/l, no pinpoint tenderness appreciated or paraspinal mm tenderness elicited on palpation b/l   NERVOUS SYSTEM:  Alert & Oriented X3, speech is clear and fluent, no dysarthria appreciated. Good concentration & very cooperative; Motor Strength 5/5 B/L upper and lower extremities, sensory is at baseline and symmetric b/l; No pronators or ankle clonus appreciated b/l, cerebellar signs grossly intact b/l. CN II-XII grossly intact b/l and symmetric; Spurling's test negative b/l; no bains's b/l appreciated.   EXTREMITIES:  2+ Peripheral Pulses, No clubbing, cyanosis, or edema, Hawkin's test negative b/l.   CHEST/LUNG: Clear to auscultation bilaterally; No rales, rhonchi, wheezing, or rubs  HEART: Regular rate and rhythm; +S1 & +S2  ABDOMEN: Soft, Nontender, Nondistended; Bowel sounds present  LYMPH: No lymphadenopathy noted  SKIN: No rashes or lesions                          14.0   8.86  )-----------( 227      ( 01 Jul 2025 12:22 )             42.3     07-01    136  |  105  |  25.3[H]  ----------------------------<  100[H]  4.3   |  24.0  |  0.84    Ca    8.8      01 Jul 2025 12:22    TPro  6.5[L]  /  Alb  3.8  /  TBili  0.3[L]  /  DBili  x   /  AST  19  /  ALT  20  /  AlkPhos  54  07-01      Urinalysis Basic - ( 01 Jul 2025 12:22 )    Color: x / Appearance: x / SG: x / pH: x  Gluc: 100 mg/dL / Ketone: x  / Bili: x / Urobili: x   Blood: x / Protein: x / Nitrite: x   Leuk Esterase: x / RBC: x / WBC x   Sq Epi: x / Non Sq Epi: x / Bacteria: x      LIVER FUNCTIONS - ( 01 Jul 2025 12:22 )  Alb: 3.8 g/dL / Pro: 6.5 g/dL / ALK PHOS: 54 U/L / ALT: 20 U/L / AST: 19 U/L / GGT: x               RADIOLOGY & ADDITIONAL STUDIES:  no new NSx images available for review   < from: MR Lumbar Spine No Cont (06.27.25 @ 16:13) >  ACC: [id number] EXAM:  MR SPINE LUMBAR   ORDERED BY: BARI LOPEZ     PROCEDURE DATE:  06/27/2025    INTERPRETATION:  MR lumbar spine without gadolinium contrast    CLINICAL INFORMATION:    low back pain  XMR  ADDITIONAL CLINICAL INFORMATION:   Spondylolisthesis M43.16    TECHNIQUE:   Sagittal and axial T2-weighted images, sagittal and coronal T1-weighted and sagittal STIR images of the lumbar spine were obtained.  CONTRAST AGENT:    NONE    COMPARISON:    No relevant prior imaging studies are available for review.    FINDINGS:    LUMBAR VERTEBRA AND ALIGNMENT:  Lumbar vertebral alignment demonstrates grade 1 anterior listhesis L4 on L5. Spondylolysis is absent. Shallow idiopathic scoliosis is dextroconvex in the mid lumbar spine. Lumbar vertebral body heights are maintained.    Marrow signal intensity within lumbar vertebra is is only mildly heterogeneous from degenerative endplate changes. Small marginal osteophytes are most prominent at L3-L4. Small Schmorl's nodes are present at several locations.   No osseous expansion or epidural disease is identified.    SACRUM:   The sacrum appears intact.  VISUALIZED PELVIC BONES:   The visualized pelvic bones appear intact.  SACROILIAC JOINTS:  The sacroiliac joints are incompletely visualized, as seen intact.    PARASPINAL SOFT TISSUES:    No lesion is identified.  VISUALIZED ABDOMINAL AND PELVIC SOFT TISSUES:   No lesion is identified.    LOWER THORACIC SPINE:  Levels adequately evaluated:   T12  At T11-T12 left central disc protrusion deforms the left ventral surface of the distal cord.  This seems to be associated with indistinct mild cord hyperintensity on the long TR images.    LUMBAR INTERVERTEBRAL DISC LEVELS:    Lumbar intervertebral discs demonstrate mild variable degenerative disc height loss and signal intensity loss on the long TR images.  These findings are most evident at L3-L4 and L4-L5.    T12-L1:  No central canal or foraminal stenosis.  L1-L2:    No central canal or foraminal stenosis.  L2-L3:    No central canal or foraminal stenosis.    L3-L4:    Left subarticular and foraminal disc extrusion deforms the left ventral thecal sac and extends into the left neural foramen. Herniated disc material extends upward in the canal to the L3 pedicle level extensively deforming the ventral lateral recess of the canal. Herniated disc material directly impresses upon the left L3 nerve root within the foramen. The right-sided canal is deformed along its ventral aspect by a separate broad shallow disc protrusion, with herniated disc material extending downward in the canal at the L4 superior endplate.. The right neural foramen is moderately narrowed by the disc material and facet arthropathy.    L4-L5:    Diffuse disc bulge aligns with the L5 superior endplate. This mildly deforms the ventral thecal sac. Disc material advanced facet arthropathy andthe vertebral malalignment contribute to moderate to severe bilateral foraminal compromise.    L5-S1:   Diffuse disc bulge with superimposed right foraminal annular fissure, hyperintense on the long TR images but appearing to remain in the plane of the disc. This herniated disc material and advanced facet arthropathy compromise the right L5 nerve rootwithin the foramen. There is only mild deformity the ventral thecal sac near the midline. The exiting S1 nerve roots remain intact. The left neural foramen is more mildly narrowed by disc material and osteophytes.    CNS STRUCTURES:  The distalcord maintains demonstrates extrinsic deformity from disc pathology as noted above with the cord signal intensity abnormality.  The conus is  normally positioned at L1.   Nerve roots of the cauda equina appear intact.      IMPRESSION:  Advanceddegenerative disc disease at T11-T12, L3-L4, L4-L5 and L5-S1    --- End of Report ---  RODNEY KARIMI MD; Attending Radiologist  This document has been electronically signed. Jun 27 2025  5:00PM    < end of copied text > HPI: Patient is a 50y old RHD Male who presents with a chief complaint of post-void dribbling and erectile disfunction since 2025, pt was in ER twice over the last week w new c/o back pain and L buttock/groin and anterolateral thigh radiculopathy complains which improved w PRN morphine/ muscle relaxants and steroids.   pt returns w recrudescence of lower back pain w left buttock/groin and anteromedial thigh pain and subjective weakness.   pt denied trauma/ Bowel or bladder dysfunction/ saddle anesthesia or incontinence.     Pt admits to h/o lower back stiffness and some mild pain since  and has been followed by Dr Chrissy Johnson by pain management who's been treating pt w PRN analgesics and SHERMAN.   pt was scheduled for an SHERMAN today w Dr Johnson, however, he decided to come to ED to make sure his current symptoms are not related to his sacrum or unstable SIJs as that was the result of his Google searching to explain ongoing symptoms.     Prior imagin2021 MRI LS  reported Multilevel degenerative changes of the lumbar spine with bilateral neural foramen stenosis, most significant at L3-4, L4-5 and L5-S1 levels, as detailed above. No appreciable change compared to MRI lumbar spine dated .    2022 CT CS: IMPRESSION: Status post anterior discectomy and spinal fusion at C6-C7 with complete osseous  bridging of the disc space. There is no central canal or foraminal stenosis.  As seen on the previous brain MRI there is fusion of C1 to the occiput and basilar invagination. Degenerative changes are seen at the atlantodental interval. The tip of the odontoid process abuts the cervical medullary junction.  There is no abnormal signal within the spinal cord or brainstem. There is no syrinx. .  At C5-C6 there is a junctional arthrosis resulting in moderate thecal sac compression and slight mass effect on the anterior margin of the spinal cord. There is moderate left foraminal stenosis.  At C7-T1 there is moderate facet arthrosis and a trace anterolisthesis. There is mild to moderate left foraminal stenosis.    23 MRI L hip & Thigh: IMPRESSION: Severe left common hamstring tendinosis with a high-grade, near complete tear of  the common hamstring origin. Torn fibers are slightly retracted. There is a small amount of fluid within the tendon defect and adjacent bone marrow edema. Limited evaluation of the knee demonstrates a hypoplastic trochlear sulcus and  lateral patella tilt. There is a small joint effusion.    25 CT A/P w contrast reported/ IMPRESSION: Unremarkable CT scan of the abdomen and pelvis with contrast. No suspicious GI or  abnormalities. Degenerative changes in the spine.      + - LOC   + - trauma   denied Headache   pt c/o + -headache  /10 on the pain scale   + - Nausea /+ - Vomiting  Right Left hand dominant   admits denies new/ worsening weakness  admits denies new/ worsening paresthesias/ numbness   admits denies  new/ worsening visual changes  admits denies C- Spine pain, + - collar/ cleared by trauma   admits denies T/LS  Spine pain, + - brace   admits denies Bowel/ Bladder dysfunction           PAST MEDICAL & SURGICAL HISTORY:    SOCIAL HISTORY:     FAMILY HISTORY: non-pertinent at this time        ROS:  CONSTITUTIONAL: No fever, weight loss, or fatigue  EYES: No eye pain, visual disturbances, or discharge  ENMT:  No difficulty hearing, tinnitus, vertigo; No sinus or throat pain  NECK: No pain or stiffness  RESPIRATORY: No cough, wheezing, chills or hemoptysis; No shortness of breath  CARDIOVASCULAR: No chest pain, palpitations, dizziness, or leg swelling  GASTROINTESTINAL: No abdominal or epigastric pain. No nausea, vomiting, or hematemesis; No diarrhea or constipation. No melena or hematochezia.  GENITOURINARY: No dysuria, frequency, hematuria, or incontinence  NEUROLOGICAL: No headaches, memory loss, loss of strength, numbness, or tremors  SKIN: No itching, burning, rashes, or lesions   MUSCULOSKELETAL: No joint pain or swelling; No muscle, back, or extremity pain        MEDICATIONS  (STANDING):    MEDICATIONS  (PRN):    Allergies  No Known Allergies    Intolerances      Vital Signs Last 24 Hrs  T(C): 36.7 (2025 10:29), Max: 36.7 (2025 10:29)  T(F): 98 (2025 10:29), Max: 98 (2025 10:29)  HR: 76 (2025 10:29) (76 - 76)  BP: 122/73 (2025 10:29) (122/73 - 122/73)  BP(mean): --  RR: 18 (2025 10:29) (18 - 18)  SpO2: 98% (2025 10:29) (98% - 98%)    Parameters below as of 2025 10:29  Patient On (Oxygen Delivery Method): room air        PHYSICAL EXAM:  GENERAL: NAD, well-groomed, well-developed, AAOx3 and very cooperative  HEAD:  Atraumatic, Normocephalic, no palpable step-off appreciated on palpation  EYES: b/l EOMI, PERRL, conjunctiva and sclera clear,   NECK: Supple, nontender to palpation  + FROM w no muscular soreness reported, neg B/B/K signs.  TS/LS: nontender to palpation midline or paraspinal muscles b/l, no pinpoint tenderness appreciated or paraspinal mm tenderness elicited on palpation b/l   NERVOUS SYSTEM:  Alert & Oriented X3, speech is clear and fluent, no dysarthria appreciated. Good concentration & very cooperative; Motor Strength 5/5 B/L upper and lower extremities, sensory is at baseline and symmetric b/l; No pronators or ankle clonus appreciated b/l, cerebellar signs grossly intact b/l. CN II-XII grossly intact b/l and symmetric; Spurling's test negative b/l; no bains's b/l appreciated.   EXTREMITIES:  2+ Peripheral Pulses, No clubbing, cyanosis, or edema, Hawkin's test negative b/l.   CHEST/LUNG: Clear to auscultation bilaterally; No rales, rhonchi, wheezing, or rubs  HEART: Regular rate and rhythm; +S1 & +S2  ABDOMEN: Soft, Nontender, Nondistended; Bowel sounds present  LYMPH: No lymphadenopathy noted  SKIN: No rashes or lesions                          14.0   8.86  )-----------( 227      ( 2025 12:22 )             42.3     07-    136  |  105  |  25.3[H]  ----------------------------<  100[H]  4.3   |  24.0  |  0.84    Ca    8.8      2025 12:22    TPro  6.5[L]  /  Alb  3.8  /  TBili  0.3[L]  /  DBili  x   /  AST  19  /  ALT  20  /  AlkPhos  54  07-01      Urinalysis Basic - ( 2025 12:22 )    Color: x / Appearance: x / SG: x / pH: x  Gluc: 100 mg/dL / Ketone: x  / Bili: x / Urobili: x   Blood: x / Protein: x / Nitrite: x   Leuk Esterase: x / RBC: x / WBC x   Sq Epi: x / Non Sq Epi: x / Bacteria: x      LIVER FUNCTIONS - ( 2025 12:22 )  Alb: 3.8 g/dL / Pro: 6.5 g/dL / ALK PHOS: 54 U/L / ALT: 20 U/L / AST: 19 U/L / GGT: x               RADIOLOGY & ADDITIONAL STUDIES:    no new NSx images available for review       Office Location: [address] [place] [address]  Office Phone: [phone]  Office Fax: [phone]  Patient Name: Alex Machado  Patient Phone Number: [phone]  Patient ID: [id number]  : 1975  Date of Exam: 2022  R. Phys. Name: Melonie Shearerndros  R. Phys. Address: [address] [place] [address], [facility]  R. Phys. Phone: [phone]  MRI-CERVICAL SPINE NON CONTRAST    History: Pt complains of neck pain with decreased ROM. Pt had prior cervical  fusion in . Eval for syrinx. No contrast.    ICD- M43.16 Spondylolisthesis lumbar    Technique: MRI of the cervical spine was performed utilizing sagittal T2, T1  and STIR sequences as well as angled axial gradient images through the disc  spaces. Correlations made with images from a MRI of the brain dated 2021    Findings: There is straightening of the cervical lordosis. Vertebral bodies are  normal in height without a fracture. Signal arising from bone marrow does not  demonstrate a focal osseous lesion or infiltrative process. Patient is status  post anterior discectomy and spinal fusion at C6-C7 with complete osseous  bridging of the disc base.    Severe degenerative changes are seen at the atlantodental interval. As seen on  the previous brain MRI there is fusion of C1 to the occiput and evidence of  basilar invagination. Tip of the odontoid process abuts the cervical medullary  junction. There is no abnormal signal within the spinal cord or brainstem. There  is no syrinx.    At C2-3, There is no disc protrusion. There is no thecal sac compression or  foraminal compromise. Facet joints are preserved.    At C3-4, there is a small disc bulge. There is right uncovertebral hypertrophy.  There is no facet arthrosis. There is no thecal sac compression. There is  mild right foraminal stenosis.    At C4-5, There is mild disc desiccation without a disc protrusion. There is  mild facet arthrosis. There is no thecal sac compression. There is no  foraminal stenosis.    At C5-6, There is moderate circumferential disc osteophytic ridge which extends  into the neural foramen bilaterally. There is mild facet arthrosis. There is  moderate thecal sac compression. There is slight mass effect on the anterior  margin of the spinal cord. There is moderate left foraminal stenosis with  abutment of exiting nerve root. There is mild right foraminal stenosis.    At C6-7, there is no central canal or foraminal stenosis. Facet joints are  preserved.    At C7-T1, there is minimal disc bulge. There is moderate facet arthrosis.  There is a trace anterolisthesis. There is no thecal sac compression. There  is mild to moderate left foraminal stenosis.    The upper thoracic levels are unremarkable.    Paraspinal musculature is within normal limits. There is no bulky  lymphadenopathy.      IMPRESSION:  Status post anterior discectomy and spinal fusion at C6-C7 with complete osseous  bridging of the disc space. There is no central canal or foraminal stenosis.    As seen on the previous brain MRI there is fusion of C1 to the occiput and basilar invagination. Degenerative changes are seen at the atlantodental interval. The tip of the odontoid process abuts the cervical medullary junction.  There is no abnormal signal within the spinal cord or brainstem. 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 .    At C5-C6 there is a junctional arthrosis resulting in moderate thecal sac compression and slight mass effect on the anterior margin of the spinal cord.  There is moderate left foraminal stenosis.    At C7-T1 there is moderate facet arthrosis and a trace anterolisthesis. There is mild to moderate left foraminal stenosis.    Signed by: Alfredo Aguilera  Signed Date: 2022 4:44 PM EST  SIGNED BY: Alfredo Aguilera M.D., Ext. [phone] 04:44 PM        Office Location: [address] [address]  Office Phone: [phone]  Office Fax: [phone]  Patient Name: Alex Machado  Patient Phone Number: [phone]  Patient ID: [id number]  : 1975  Date of Exam: 2025  R. Phys. Name: Najma Pires  R. Phys. Address: [address] [place] [address], [place] [address]  R. Phys. Phone: [phone]  EXAM:  CT-ABDOMEN AND PELVIS POST IV CONTRAST  HISTORY: R14.0 Bloating  COMPARISON: Ultrasound dated 2025    TECHNIQUE:  CT scan of the abdomen and pelvis was performed. Oral contrast was administered. Following the intravenous contrast administration of75 cc Omnipaque 350. . Axial, sagittal and coronal images were reconstructed using iterative reconstruction technique. This study was performed using automatic exposure control (radiation dose reduction software) to obtain a diagnostic image quality scan with patient dose as low as reasonably achievable. mA and kV were adjusted according to patient size. The administered radiation dose was 9.285 mSv.      FINDINGS:  LUNG BASES: The visualized heart and lung bases are unremarkable  LIVER: Normal  SPLEEN: Normal  PANCREAS: Normal  GALLBLADDER: Unremarkable  BILIARY TREE: Normal  ADRENALS: Normal  KIDNEYS: No calculi. The kidneys have a normal-size, axis and position. Normal enhancement without any suspicious lesions.  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  BOWEL/MESENTERY/OMENTUM: The stomach is partially distended and unremarkable. The small bowel and mesentery are unremarkable. Normal-size mesenteric lymph nodes. I think I see a normal appendix. The colon is unremarkable with a normal amount of feces. No ascites.  PERITONEUM/ABDOMINAL WALL: Unremarkable.  PELVIC LYMPH NODES: Normal  PELVIC VISCERA: The prostate is not enlarged.  URINARY BLADDER: Underdistended but otherwise unremarkable  BONES/SOFT TISSUES: There are degenerative changes in the spine with disc disease and endplate changes. There are some osteophyte formation and facet arthropathy.    IMPRESSION:  1. Unremarkable CT scan of the abdomen and pelvis with contrast. No suspicious GI or  abnormalities.  2. Degenerative changes in the spine.  Electronically Signed By: Remigio Bueno M.D., on 4/3/2025 4:56 PM  SIGNED BY: Remigio Bueno M.D., Ext. [phone] 04:56 PM        Office Location: [address] [place] [address]  Office Phone: [phone]  Office Fax: [phone]  Patient Name: Alex Machado  Patient Phone Number: [phone]  Patient ID: [id number]  : 1975  Date of Exam: 2023  R. Phys. Name: Raffy Emmy  R. PhysJake Address: [address] [place] [address]  R. Phys. Phone: [phone]  MRI-LEFT HIP NON CONTRAST  History: L hip pain x2 years  ICD- M25.552 Left hip pain    Technique: MRI of the left hip was performed utilizing large field coronal STIR images of the pelvis. High-resolution axial, coronal, sagittal and oblique axial proton density images were obtained.  Comparison: none    Findings:  BONES: There is no fracture, avascular necrosis or transient osteoporosis of the hips or pelvis. Signal arising from bone marrow is unremarkable. There is no focal osseous lesion.    FEMOROACETABULAR JOINT: High-resolution images of the hip demonstrate normal offset of the femoral head/neck junction without a CAM lesion. There is a obliquely oriented tear the anterior superior labral base with irregularity of the torn labral fibers best seen on series 4 image 11. There is mild left femoral acetabular cartilage loss. There is a minimal joint effusion.    TENDONS: There is insertional gluteus minimus tendinosis with intrasubstance signal at the insertion. There is mild surrounding soft tissue edema. Gluteus medius is intact. Short external rotators are unremarkable. Iliopsoas is normal in appearance.    There is severe left common hamstring tendinosis with a high-grade near complete tear of the common hamstring origin. Torn fibers retracted distally 1.4 cm and are prominently thickened and hyperintense. Small amount of fluid is seen within the tendon defect. There is mild bone marrow edema within the ischial tuberosity.    There is intrasubstance degeneration at the adductor origins along the inferior margin of the pubis bilaterally. On the left there is a small partial-thickness tear, series 2 image 8. There is no retraction. There is no surrounding edema.    BURSAE: There is no greater trochanteric bursitis. There is no iliopsoas bursitis.    NEUROVASCULAR: There is preservation of fat plane surrounding the sciatic nerve and femoral neurovascular bundles.    MISCELLANEOUS:    Limited evaluation of the contralateral hip is unremarkable. SI joints are  degenerated. Degenerative changes are seen at the pubic symphysis. There is  preservation of disc height and signal of the lumbosacral junction. Limited  evaluation of the pelvic structures is unremarkable.    IMPRESSION:  Severe left common hamstring tendinosis with a high-grade, near complete tear of the common hamstring origin. Torn fibers are slightly retracted distally. There is mild bone marrow edema within the ischial tuberosity.  Mild left femoral acetabular arthrosis and a nondisplaced obliquely oriented tear at the anterior superior labral base.  Mild gluteus minimus tendinosis.  Degeneration of the adductor origins bilaterally, left greater than right. There is a small partial tear at the origin of the left adductor origin. There is no surrounding edema.    Signed by: Alfredo Aguilera  Signed Date: 2023 1:41 PM EDT  SIGNED BY: Alfredo Aguilera M.D., Ext. [phone] 01:41 PM          < from: MR Lumbar Spine No Cont (25 @ 16:13) >  ACC: [id number] EXAM:  MR SPINE LUMBAR   ORDERED BY: BARI LOPEZ     PROCEDURE DATE:  2025    INTERPRETATION:  MR lumbar spine without gadolinium contrast    CLINICAL INFORMATION:    low back pain  XMR  ADDITIONAL CLINICAL INFORMATION:   Spondylolisthesis M43.16    TECHNIQUE:   Sagittal and axial T2-weighted images, sagittal and coronal T1-weighted and sagittal STIR images of the lumbar spine were obtained.  CONTRAST AGENT:    NONE    COMPARISON:    No relevant prior imaging studies are available for review.    FINDINGS:    LUMBAR VERTEBRA AND ALIGNMENT:  Lumbar vertebral alignment demonstrates grade 1 anterior listhesis L4 on L5. Spondylolysis is absent. Shallow idiopathic scoliosis is dextroconvex in the mid lumbar spine. Lumbar vertebral body heights are maintained.    Marrow signal intensity within lumbar vertebra is is only mildly heterogeneous from degenerative endplate changes. Small marginal osteophytes are most prominent at L3-L4. Small Schmorl's nodes are present at several locations.   No osseous expansion or epidural disease is identified.    SACRUM:   The sacrum appears intact.  VISUALIZED PELVIC BONES:   The visualized pelvic bones appear intact.  SACROILIAC JOINTS:  The sacroiliac joints are incompletely visualized, as seen intact.    PARASPINAL SOFT TISSUES:    No lesion is identified.  VISUALIZED ABDOMINAL AND PELVIC SOFT TISSUES:   No lesion is identified.    LOWER THORACIC SPINE:  Levels adequately evaluated:   T12  At T11-T12 left central disc protrusion deforms the left ventral surface of the distal cord.  This seems to be associated with indistinct mild cord hyperintensity on the long TR images.    LUMBAR INTERVERTEBRAL DISC LEVELS:    Lumbar intervertebral discs demonstrate mild variable degenerative disc height loss and signal intensity loss on the long TR images.  These findings are most evident at L3-L4 and L4-L5.    T12-L1:  No central canal or foraminal stenosis.  L1-L2:    No central canal or foraminal stenosis.  L2-L3:    No central canal or foraminal stenosis.    L3-L4:    Left subarticular and foraminal disc extrusion deforms the left ventral thecal sac and extends into the left neural foramen. Herniated disc material extends upward in the canal to the L3 pedicle level extensively deforming the ventral lateral recess of the canal. Herniated disc material directly impresses upon the left L3 nerve root within the foramen. The right-sided canal is deformed along its ventral aspect by a separate broad shallow disc protrusion, with herniated disc material extending downward in the canal at the L4 superior endplate.. The right neural foramen is moderately narrowed by the disc material and facet arthropathy.    L4-L5:    Diffuse disc bulge aligns with the L5 superior endplate. This mildly deforms the ventral thecal sac. Disc material advanced facet arthropathy andthe vertebral malalignment contribute to moderate to severe bilateral foraminal compromise.    L5-S1:   Diffuse disc bulge with superimposed right foraminal annular fissure, hyperintense on the long TR images but appearing to remain in the plane of the disc. This herniated disc material and advanced facet arthropathy compromise the right L5 nerve rootwithin the foramen. There is only mild deformity the ventral thecal sac near the midline. The exiting S1 nerve roots remain intact. The left neural foramen is more mildly narrowed by disc material and osteophytes.    CNS STRUCTURES:  The distalcord maintains demonstrates extrinsic deformity from disc pathology as noted above with the cord signal intensity abnormality.  The conus is  normally positioned at L1.   Nerve roots of the cauda equina appear intact.      IMPRESSION:  Advanced degenerative disc disease at T11-T12, L3-L4, L4-L5 and L5-S1    --- End of Report ---  RODNEY KARIMI MD; Attending Radiologist  This document has been electronically signed. 2025  5:00PM    < end of copied text > HPI: Patient is a 50y old RHD Male who presents with a chief complaint of post-void dribbling and erectile disfunction since 2025, pt was in ER twice over the last week w new c/o back pain and L buttock/groin and anterolateral thigh radiculopathy complains which improved w PRN morphine/ muscle relaxants and steroids.   pt returns w recrudescence of lower back pain w left buttock/groin and anteromedial thigh pain and subjective weakness.   pt denied trauma/ Bowel or bladder dysfunction/ saddle anesthesia or incontinence.     Pt admits to h/o lower back stiffness and some mild pain since  and has been followed by Dr Chrissy Johnson by pain management who's been treating pt w PRN analgesics and SHERMAN.   pt was scheduled for an SHERMAN today w Dr Johnson, however, he decided to come to ED to make sure his current symptoms are not related to his sacrum or unstable SIJs as that was the result of his Google searching to explain ongoing symptoms.     Prior imagin2021 MRI LS  reported Multilevel degenerative changes of the lumbar spine with bilateral neural foramen stenosis, most significant at L3-4, L4-5 and L5-S1 levels, as detailed above. No appreciable change compared to MRI lumbar spine dated .    2022 CT CS: IMPRESSION: Status post anterior discectomy and spinal fusion at C6-C7 with complete osseous  bridging of the disc space. There is no central canal or foraminal stenosis.  As seen on the previous brain MRI there is fusion of C1 to the occiput and basilar invagination. Degenerative changes are seen at the atlantodental interval. The tip of the odontoid process abuts the cervical medullary junction.  There is no abnormal signal within the spinal cord or brainstem. There is no syrinx. .  At C5-C6 there is a junctional arthrosis resulting in moderate thecal sac compression and slight mass effect on the anterior margin of the spinal cord. There is moderate left foraminal stenosis.  At C7-T1 there is moderate facet arthrosis and a trace anterolisthesis. There is mild to moderate left foraminal stenosis.    23 MRI L hip & Thigh: IMPRESSION: Severe left common hamstring tendinosis with a high-grade, near complete tear of  the common hamstring origin. Torn fibers are slightly retracted. There is a small amount of fluid within the tendon defect and adjacent bone marrow edema. Limited evaluation of the knee demonstrates a hypoplastic trochlear sulcus and  lateral patella tilt. There is a small joint effusion.    25 CT A/P w contrast reported/ IMPRESSION: Unremarkable CT scan of the abdomen and pelvis with contrast. No suspicious GI or  abnormalities. Degenerative changes in the spine.      - LOC   - trauma   denied Headache   - Nausea / - Vomiting  Right hand dominant   denies new/ worsening weakness  denies new/ worsening paresthesias/ numbness   denies  new/ worsening visual changes  denies C- Spine pain  denies T/LS  Spine pain  denies Bowel/ Bladder dysfunction         PAST MEDICAL & SURGICAL HISTORY:     C6/7 ACDF w Dr Talbert    C6-7 foraminatomies w Dr Velasquez   -present following w pain management w Dr Johnson    inguinal hernia repair w D Spaniolos   followed w  Dr Aiken     SOCIAL HISTORY:   FAMILY HISTORY: non-pertinent at this time        MEDICATIONS  (STANDING):    MEDICATIONS  (PRN):  Allergies  No Known Allergies    Intolerances      Vital Signs Last 24 Hrs  T(C): 36.7 (2025 10:29), Max: 36.7 (2025 10:29)  T(F): 98 (2025 10:29), Max: 98 (2025 10:29)  HR: 76 (2025 10:29) (76 - 76)  BP: 122/73 (2025 10:29) (122/73 - 122/73)  BP(mean): --  RR: 18 (2025 10:29) (18 - 18)  SpO2: 98% (2025 10:29) (98% - 98%)    Parameters below as of 2025 10:29  Patient On (Oxygen Delivery Method): room air        PHYSICAL EXAM:  GENERAL: NAD, well-groomed, well-developed, AAOx3 and cooperative  HEAD: Atraumatic, Normocephalic, no palpable step-off appreciated on palpation  EYES: b/l EOMI, PERRL, conjunctiva and sclera clear,   NECK: Supple, nontender to palpation  + FROM w no muscular soreness reported, neg B/B/K signs.  TS/LS: nontender to palpation midline or paraspinal muscles b/l, no pinpoint tenderness appreciated or paraspinal mm tenderness elicited on palpation b/l  SACRUM: mild tenderness over the S4 midline/ coccyx   NERVOUS SYSTEM: Alert & Oriented X3, speech is clear and fluent, no dysarthria appreciated. Good concentration & very cooperative; Motor Strength 5/5 B/L upper and lower extremities, sensory is at baseline and symmetric b/l; No ankle clonus appreciated b/l, FS/TML. patellar DTRs 2+ b/l. no SLR test b/l at 50 degrees.   EXTREMITIES: 2+ Peripheral Pulses, No clubbing, cyanosis, or edema,   SKIN: No rashes or lesions appreciated b/l UEs and LEs                           14.0   8.86  )-----------( 227      ( 2025 12:22 )             42.3     07    136  |  105  |  25.3[H]  ----------------------------<  100[H]  4.3   |  24.0  |  0.84    Ca    8.8      2025 12:22    TPro  6.5[L]  /  Alb  3.8  /  TBili  0.3[L]  /  DBili  x   /  AST  19  /  ALT  20  /  AlkPhos  54  07-01      Urinalysis Basic - ( 2025 12:22 )    Color: x / Appearance: x / SG: x / pH: x  Gluc: 100 mg/dL / Ketone: x  / Bili: x / Urobili: x   Blood: x / Protein: x / Nitrite: x   Leuk Esterase: x / RBC: x / WBC x   Sq Epi: x / Non Sq Epi: x / Bacteria: x      LIVER FUNCTIONS - ( 2025 12:22 )  Alb: 3.8 g/dL / Pro: 6.5 g/dL / ALK PHOS: 54 U/L / ALT: 20 U/L / AST: 19 U/L / GGT: x               RADIOLOGY & ADDITIONAL STUDIES:    no new NSx images available for review       Office Location: [address] [place] [address]  Office Phone: [phone]  Office Fax: [phone]  Patient Name: Alex Machado  Patient Phone Number: [phone]  Patient ID: [id number]  : 1975  Date of Exam: 2022  R. Phys. Name: William Shearer  R. Phys. Address: [address] [place] [address], [facility], [place], [address]  R. Phys. Phone: [phone]  MRI-CERVICAL SPINE NON CONTRAST    History: Pt complains of neck pain with decreased ROM. Pt had prior cervical  fusion in . Eval for syrinx. No contrast.    ICD- M43.16 Spondylolisthesis lumbar    Technique: MRI of the cervical spine was performed utilizing sagittal T2, T1  and STIR sequences as well as angled axial gradient images through the disc  spaces. Correlations made with images from a MRI of the brain dated 2021    Findings: There is straightening of the cervical lordosis. Vertebral bodies are  normal in height without a fracture. Signal arising from bone marrow does not  demonstrate a focal osseous lesion or infiltrative process. Patient is status  post anterior discectomy and spinal fusion at C6-C7 with complete osseous  bridging of the disc base.    Severe degenerative changes are seen at the atlantodental interval. As seen on  the previous brain MRI there is fusion of C1 to the occiput and evidence of  basilar invagination. Tip of the odontoid process abuts the cervical medullary  junction. There is no abnormal signal within the spinal cord or brainstem. There  is no syrinx.    At C2-3, There is no disc protrusion. There is no thecal sac compression or  foraminal compromise. Facet joints are preserved.    At C3-4, there is a small disc bulge. There is right uncovertebral hypertrophy.  There is no facet arthrosis. There is no thecal sac compression. There is  mild right foraminal stenosis.    At C4-5, There is mild disc desiccation without a disc protrusion. There is  mild facet arthrosis. There is no thecal sac compression. There is no  foraminal stenosis.    At C5-6, There is moderate circumferential disc osteophytic ridge which extends  into the neural foramen bilaterally. There is mild facet arthrosis. There is  moderate thecal sac compression. There is slight mass effect on the anterior  margin of the spinal cord. There is moderate left foraminal stenosis with  abutment of exiting nerve root. There is mild right foraminal stenosis.    At C6-7, there is no central canal or foraminal stenosis. Facet joints are  preserved.    At C7-T1, there is minimal disc bulge. There is moderate facet arthrosis.  There is a trace anterolisthesis. There is no thecal sac compression. There  is mild to moderate left foraminal stenosis.    The upper thoracic levels are unremarkable.    Paraspinal musculature is within normal limits. There is no bulky  lymphadenopathy.      IMPRESSION:  Status post anterior discectomy and spinal fusion at C6-C7 with complete osseous  bridging of the disc space. There is no central canal or foraminal stenosis.    As seen on the previous brain MRI there is fusion of C1 to the occiput and basilar invagination. Degenerative changes are seen at the atlantodental interval. The tip of the odontoid process abuts the cervical medullary junction.  There is no abnormal signal within the spinal cord or brainstem. 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 .    At C5-C6 there is a junctional arthrosis resulting in moderate thecal sac compression and slight mass effect on the anterior margin of the spinal cord.  There is moderate left foraminal stenosis.    At C7-T1 there is moderate facet arthrosis and a trace anterolisthesis. There is mild to moderate left foraminal stenosis.    Signed by: Alfredo Aguilera  Signed Date: 2022 4:44 PM EST  SIGNED BY: Alfredo Aguilera M.D., Ext. [phone] 04:44 PM        Office Location: [address] [place] [address]  Office Phone: [phone]  Office Fax: [phone]  Patient Name: Alex Machado  Patient Phone Number: [phone]  Patient ID: [id number]  : 1975  Date of Exam: 2025  R. Phys. Name: Najma Pires  R. PhysJake Address: [address] [place] [address]  R. Phys. Phone: [phone]  EXAM:  CT-ABDOMEN AND PELVIS POST IV CONTRAST  HISTORY: R14.0 Bloating  COMPARISON: Ultrasound dated 2025    TECHNIQUE:  CT scan of the abdomen and pelvis was performed. Oral contrast was administered. Following the intravenous contrast administration of75 cc Omnipaque 350. . Axial, sagittal and coronal images were reconstructed using iterative reconstruction technique. This study was performed using automatic exposure control (radiation dose reduction software) to obtain a diagnostic image quality scan with patient dose as low as reasonably achievable. mA and kV were adjusted according to patient size. The administered radiation dose was 9.285 mSv.      FINDINGS:  LUNG BASES: The visualized heart and lung bases are unremarkable  LIVER: Normal  SPLEEN: Normal  PANCREAS: Normal  GALLBLADDER: Unremarkable  BILIARY TREE: Normal  ADRENALS: Normal  KIDNEYS: No calculi. The kidneys have a normal-size, axis and position. Normal enhancement without any suspicious lesions.  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  BOWEL/MESENTERY/OMENTUM: The stomach is partially distended and unremarkable. The small bowel and mesentery are unremarkable. Normal-size mesenteric lymph nodes. I think I see a normal appendix. The colon is unremarkable with a normal amount of feces. No ascites.  PERITONEUM/ABDOMINAL WALL: Unremarkable.  PELVIC LYMPH NODES: Normal  PELVIC VISCERA: The prostate is not enlarged.  URINARY BLADDER: Underdistended but otherwise unremarkable  BONES/SOFT TISSUES: There are degenerative changes in the spine with disc disease and endplate changes. There are some osteophyte formation and facet arthropathy.    IMPRESSION:  1. Unremarkable CT scan of the abdomen and pelvis with contrast. No suspicious GI or  abnormalities.  2. Degenerative changes in the spine.  Electronically Signed By: Remigio Bueno M.D., on 4/3/2025 4:56 PM  SIGNED BY: Remigio Bueno M.D., Ext. [phone] 04:56 PM        Office Location: [address] [place] [address]  Office Phone: [phone]  Office Fax: [phone]  Patient Name: Alex Machado  Patient Phone Number: [phone]  Patient ID: [id number]  : 1975  Date of Exam: 2023  R. Phys. Name: Emmy Akbar  RJake PhysJake Address: [address] [place] [address]  R. Phys. Phone: [phone]  MRI-LEFT HIP NON CONTRAST  History: L hip pain x2 years  ICD- M25.552 Left hip pain    Technique: MRI of the left hip was performed utilizing large field coronal STIR images of the pelvis. High-resolution axial, coronal, sagittal and oblique axial proton density images were obtained.  Comparison: none    Findings:  BONES: There is no fracture, avascular necrosis or transient osteoporosis of the hips or pelvis. Signal arising from bone marrow is unremarkable. There is no focal osseous lesion.    FEMOROACETABULAR JOINT: High-resolution images of the hip demonstrate normal offset of the femoral head/neck junction without a CAM lesion. There is a obliquely oriented tear the anterior superior labral base with irregularity of the torn labral fibers best seen on series 4 image 11. There is mild left femoral acetabular cartilage loss. There is a minimal joint effusion.    TENDONS: There is insertional gluteus minimus tendinosis with intrasubstance signal at the insertion. There is mild surrounding soft tissue edema. Gluteus medius is intact. Short external rotators are unremarkable. Iliopsoas is normal in appearance.    There is severe left common hamstring tendinosis with a high-grade near complete tear of the common hamstring origin. Torn fibers retracted distally 1.4 cm and are prominently thickened and hyperintense. Small amount of fluid is seen within the tendon defect. There is mild bone marrow edema within the ischial tuberosity.    There is intrasubstance degeneration at the adductor origins along the inferior margin of the pubis bilaterally. On the left there is a small partial-thickness tear, series 2 image 8. There is no retraction. There is no surrounding edema.    BURSAE: There is no greater trochanteric bursitis. There is no iliopsoas bursitis.    NEUROVASCULAR: There is preservation of fat plane surrounding the sciatic nerve and femoral neurovascular bundles.    MISCELLANEOUS:    Limited evaluation of the contralateral hip is unremarkable. SI joints are  degenerated. Degenerative changes are seen at the pubic symphysis. There is  preservation of disc height and signal of the lumbosacral junction. Limited  evaluation of the pelvic structures is unremarkable.    IMPRESSION:  Severe left common hamstring tendinosis with a high-grade, near complete tear of the common hamstring origin. Torn fibers are slightly retracted distally. There is mild bone marrow edema within the ischial tuberosity.  Mild left femoral acetabular arthrosis and a nondisplaced obliquely oriented tear at the anterior superior labral base.  Mild gluteus minimus tendinosis.  Degeneration of the adductor origins bilaterally, left greater than right. There is a small partial tear at the origin of the left adductor origin. There is no surrounding edema.    Signed by: Alfredo Aguilera  Signed Date: 2023 1:41 PM EDT  SIGNED BY: Alfredo Aguilera M.D., Ext. [phone] 01:41 PM          < from: MR Lumbar Spine No Cont (25 @ 16:13) >  ACC: [id number] EXAM:  MR SPINE LUMBAR   ORDERED BY: BARI LOPEZ     PROCEDURE DATE:  2025    INTERPRETATION:  MR lumbar spine without gadolinium contrast    CLINICAL INFORMATION:    low back pain  XMR  ADDITIONAL CLINICAL INFORMATION:   Spondylolisthesis M43.16    TECHNIQUE:   Sagittal and axial T2-weighted images, sagittal and coronal T1-weighted and sagittal STIR images of the lumbar spine were obtained.  CONTRAST AGENT:    NONE    COMPARISON:    No relevant prior imaging studies are available for review.    FINDINGS:    LUMBAR VERTEBRA AND ALIGNMENT:  Lumbar vertebral alignment demonstrates grade 1 anterior listhesis L4 on L5. Spondylolysis is absent. Shallow idiopathic scoliosis is dextroconvex in the mid lumbar spine. Lumbar vertebral body heights are maintained.    Marrow signal intensity within lumbar vertebra is is only mildly heterogeneous from degenerative endplate changes. Small marginal osteophytes are most prominent at L3-L4. Small Schmorl's nodes are present at several locations.   No osseous expansion or epidural disease is identified.    SACRUM:   The sacrum appears intact.  VISUALIZED PELVIC BONES:   The visualized pelvic bones appear intact.  SACROILIAC JOINTS:  The sacroiliac joints are incompletely visualized, as seen intact.    PARASPINAL SOFT TISSUES:    No lesion is identified.  VISUALIZED ABDOMINAL AND PELVIC SOFT TISSUES:   No lesion is identified.    LOWER THORACIC SPINE:  Levels adequately evaluated:   T12  At T11-T12 left central disc protrusion deforms the left ventral surface of the distal cord.  This seems to be associated with indistinct mild cord hyperintensity on the long TR images.    LUMBAR INTERVERTEBRAL DISC LEVELS:    Lumbar intervertebral discs demonstrate mild variable degenerative disc height loss and signal intensity loss on the long TR images.  These findings are most evident at L3-L4 and L4-L5.    T12-L1:  No central canal or foraminal stenosis.  L1-L2:    No central canal or foraminal stenosis.  L2-L3:    No central canal or foraminal stenosis.    L3-L4:    Left subarticular and foraminal disc extrusion deforms the left ventral thecal sac and extends into the left neural foramen. Herniated disc material extends upward in the canal to the L3 pedicle level extensively deforming the ventral lateral recess of the canal. Herniated disc material directly impresses upon the left L3 nerve root within the foramen. The right-sided canal is deformed along its ventral aspect by a separate broad shallow disc protrusion, with herniated disc material extending downward in the canal at the L4 superior endplate.. The right neural foramen is moderately narrowed by the disc material and facet arthropathy.    L4-L5:    Diffuse disc bulge aligns with the L5 superior endplate. This mildly deforms the ventral thecal sac. Disc material advanced facet arthropathy and the vertebral malalignment contribute to moderate to severe bilateral foraminal compromise.    L5-S1:   Diffuse disc bulge with superimposed right foraminal annular fissure, hyperintense on the long TR images but appearing to remain in the plane of the disc. This herniated disc material and advanced facet arthropathy compromise the right L5 nerve rootwithin the foramen. There is only mild deformity the ventral thecal sac near the midline. The exiting S1 nerve roots remain intact. The left neural foramen is more mildly narrowed by disc material and osteophytes.    CNS STRUCTURES:  The distal cord maintains demonstrates extrinsic deformity from disc pathology as noted above with the cord signal intensity abnormality.  The conus is  normally positioned at L1.   Nerve roots of the cauda equina appear intact.      IMPRESSION:  Advanced degenerative disc disease at T11-T12, L3-L4, L4-L5 and L5-S1    --- End of Report ---  RODNEY KARIMI MD; Attending Radiologist  This document has been electronically signed. 2025  5:00PM    < end of copied text >

## 2025-07-01 NOTE — ED CDU PROVIDER INITIAL DAY NOTE - DETAILS
Pt with acute on chronic back pain poorly controlled at home presents for revaluation pt placed in obs for further evaluation and imaging of lower back possible pain management consult.

## 2025-07-01 NOTE — ED ADULT NURSE REASSESSMENT NOTE - NS ED NURSE REASSESS COMMENT FT1
report given to RN in obs. pt alert and oriented x4 respirations even unlabored. pt c/o weakness to LLE for a few weeks. pending MR and neuro

## 2025-07-01 NOTE — ED ADULT NURSE NOTE - NSFALLRISKINTERV_ED_ALL_ED
Assistance OOB with selected safe patient handling equipment if applicable/Assistance with ambulation/Communicate fall risk and risk factors to all staff, patient, and family/Monitor gait and stability/Provide visual cue: yellow wristband, yellow gown, etc/Reinforce activity limits and safety measures with patient and family/Call bell, personal items and telephone in reach/Instruct patient to call for assistance before getting out of bed/chair/stretcher/Non-slip footwear applied when patient is off stretcher/[place] to call system/Physically safe environment - no spills, clutter or unnecessary equipment/Purposeful Proactive Rounding/Room/bathroom lighting operational, light cord in reach

## 2025-07-01 NOTE — PROGRESS NOTE ADULT - NSPROGADDITIONALINFOA_GEN_ALL_CORE
NSGY Attg:    see above    patient seen and examined by PA staff    imaging reviewed    agree with above  LLE radiculopathy secondary to extruded superiorly migrated L3-4 disc fragment with lateral recess stenosis  no severe LS spine central stenosis  + central stenosis at T11-12 without compelling signal abnormality on sag T2 imaging to my review  - check PVR  - consider C and T spine MRIs if PVR abnormal  - will follow

## 2025-07-01 NOTE — ED CDU PROVIDER INITIAL DAY NOTE - PROGRESS NOTE DETAILS
spoke with pt at bedside, pain controlled, stable, pending MR tonight, he is requesting to speak with Pain management after MRs for injection - leno mora

## 2025-07-01 NOTE — ED ADULT NURSE REASSESSMENT NOTE - NS ED NURSE REASSESS COMMENT FT1
Assumed care of pt from previous RN at 1920. Pt A&Ox4, RR even and unlabored. Offering no complaints at this time and no acute distress noted. Safety maintained, call bell within reach. Pt updated on plan of care; awaiting MRI. Assumed care of pt from previous RN at 1920. GUERRERO Kendall at bedside. Pt A&Ox4, RR even and unlabored. Offering no complaints at this time and no acute distress noted. Safety maintained, call bell within reach. Pt updated on plan of care; awaiting MRI.

## 2025-07-01 NOTE — ED PROVIDER NOTE - NS ED ROS FT
Constitutional: no fever, no chills  Eyes: no pain, no redness, no visual changes.  ENMT: no ear pain, no hearing problems, no nasal congestion/drainage, no throat pain, no neck pain/stiffness  CV: no chest pain, no edema  Resp: no cough, no dyspnea  GI: no abdominal pain, no bloating, no constipation, no diarrhea, no nausea, no vomiting.  : no dysuria, no hematuria  MSK: + msk pain, no weakness+ back pain   Skin: no lesions, and no rashes   Neuro: no LOC, no headache, + sensory deficits, and + weakness

## 2025-07-01 NOTE — ED CDU PROVIDER INITIAL DAY NOTE - CLINICAL SUMMARY MEDICAL DECISION MAKING FREE TEXT BOX
PT with    lower back pain     placed in obs for Diagnostic uncertainty. PT seen BY OBS PA found to be appropriate CDU PT care transferred to PA.

## 2025-07-01 NOTE — ED CDU PROVIDER INITIAL DAY NOTE - PHYSICAL EXAMINATION
General: well appearing, NAD  Head:  NC, AT  Eyes: EOMI, no scleral icterus  Ears: no erythema/drainage  Nose: midline, no bleeding/drainage  Throat: MMM  Cardiac: RRR, no apparent murmurs, no lower extremity edema  Respiratory: CTABL, equal chest wall expansions  Abdomen: soft, ND, NT, no rebound, no guarding, nonperitonitic  MSK/Vascular: full ROM, soft compartments, warm extremities  Neuro: AAOx3, decreased senbsation LLE: decreased sensation medial and lateral prox thigh, distal L medial thigh, weakness 3/5 LLE vs 5/5 RLE   Psych: calm, cooperative, normal affect

## 2025-07-01 NOTE — ED ADULT NURSE NOTE - CHIEF COMPLAINT QUOTE
Pt walks in for left leg pain. Pt was seen on Friday where MRI showed herniated disk but is having worsening pain since. Pt has numbness/tingling from hip to kneecap.

## 2025-07-01 NOTE — ED ADULT NURSE NOTE - OBJECTIVE STATEMENT
Pt A&OX4. Came in w/ c/o worsening L hip pain, L knee pain w/ numbness and tingling. Also endorsing bilateral groin pain. Pt has MRI's done that showed herniated discs. Denies urinary symptoms, trauma, chest pain, palpitations, SOB. VS stable, RR even and unlabored, safety maintained.

## 2025-07-01 NOTE — PROGRESS NOTE ADULT - ASSESSMENT
50y old RHD Male who presents with a chief complaint of post-void dribbling and erectile disfunction since February/March 2025;  pt was in ER twice over the last week w new c/o back pain and L buttock/groin and anterolateral thigh radiculopathy complains which improved w PRN morphine/ muscle relaxants and steroids.   pt returns w recrudescence of lower back pain w left buttock/groin and anteromedial thigh pain and subjective weakness.   pt denied trauma/ Bowel or bladder dysfunction/ saddle anesthesia or incontinence.   Pt admits to h/o lower back stiffness and some mild pain since 2021 and has been followed by Dr Chrissy Johnson by pain management who's been treating pt w PRN analgesics and SHERMAN.   pt was scheduled for an SHERMAN today w Dr Johnson, however, he decided to come to ED to make sure his current symptoms are not related to his sacrum or unstable SIJs as that was the result of his Google searching to explain ongoing symptoms.   outpatient MRIs and CTs reviewed at [facility] noting severe degenerative changes to left him/acetabulum and ligamentous tears in 2023.     6/27 MRI LS significant for T11-12 DDD w some stenosis without definitive cord signal change as well as advanced degenerative disc disease at T11-T12, L3-L4, L4-L5 and L5-S1.    case/ images and exam reviewed w Dr Laguna:  no urgent NSx intervention indicated   consider orthopedic eval for left hip/groin pain given prior MRI findings   pls obtain PVR & if PVR is elevated needs C and T spine MRI for additional review/ recommendations   PVR 0ml per chart review/ Tosha West (PENNY)  (Signed 01-Jul-2025 18:05)  pt is recommended to f/u w Pain management for SHERMAN trial   pt is recommended to f/u w Dr Judy Burgess ( prior visit) or Dr Seven Velasquez outpatient (Posterior cervical foraminectomy in 2005)  reconsult as needed   case and plan above d/w Dr Angel Craft    50y old RHD Male who presents with a chief complaint of post-void dribbling and erectile disfunction since February/March 2025;  pt was in ER twice over the last week w new c/o back pain and L buttock/groin and anterolateral thigh radiculopathy complains which improved w PRN morphine/ muscle relaxants and steroids.   pt returns w recrudescence of lower back pain w left buttock/groin and anteromedial thigh pain and subjective weakness.   pt denied trauma/ Bowel or bladder dysfunction/ saddle anesthesia or incontinence.   Pt admits to h/o lower back stiffness and some mild pain since 2021 and has been followed by Dr Chrissy Johnson by pain management who's been treating pt w PRN analgesics and SHERMAN.   pt was scheduled for an SHERMAN today w Dr Johnson, however, he decided to come to ED to make sure his current symptoms are not related to his sacrum or unstable SIJs as that was the result of his Google searching to explain ongoing symptoms.   outpatient MRIs and CTs reviewed at [facility] noting severe degenerative changes to left him/acetabulum and ligamentous tears in 2023.     6/27 MRI LS significant for T11-12 DDD w some stenosis without definitive cord signal change as well as advanced degenerative disc disease at T11-T12, L3-L4, L4-L5 and L5-S1.    case/ images and exam reviewed w Dr Laguna:  no urgent NSx intervention indicated   consider orthopedic eval for left hip/groin pain given prior MRI findings   pls obtain PVR & if PVR is elevated needs C and T spine MRI for additional review/ recommendations   PVR 0ml per chart review/ Tosha West (PENNY)  (Signed 01-Jul-2025 18:05)  pt is recommended to f/u w Pain management for SHERMAN trial   pt is recommended to f/u w Dr Judy Burgess ( prior visit) or Dr Seven Velasquez outpatient (Posterior cervical foraminectomy in 2005)  case and plan above d/w Dr Angel Craft

## 2025-07-01 NOTE — ED ADULT NURSE REASSESSMENT NOTE - NS ED NURSE REASSESS COMMENT FT1
Assumed care 1600, received report from Lulú RN, pt a&ox4, placed in observation for MRI, pt VSS, respirations even and unlabored on room air, no distress noted.

## 2025-07-01 NOTE — ED CDU PROVIDER INITIAL DAY NOTE - ATTENDING APP SHARED VISIT CONTRIBUTION OF CARE
I, Atif Little, performed a face to face bedside interview with this patient regarding history of present illness, and completed an independent physical examination. I personally made/approved the management plan and take responsibility for the patient management. I have communicated the patient’s plan of care and disposition with the ACP.  Pt placed on obs for persistent low back pain, now with LLE anterior numbness   Gen: NAD, well appearing  CV: RRR  Pul: CTA b/l  Abd: Soft, non-distended, non-tender  Neuro: decreased sensation to the anterior L thigh  Pt admitted for pain control, neurosurg

## 2025-07-01 NOTE — ED ADULT TRIAGE NOTE - AS HEIGHT TYPE
Problem: Pain  Goal: #Acceptable pain level achieved/maintained at rest using NRS/Faces  Description: This goal is used for patients who can self-report.  Acceptable means the level is at or below the identified comfort/function goal.  Outcome: Outcome Met, Continue evaluating goal progress toward completion  Goal: # Acceptable pain level achieved/maintained with activity using NRS/Faces  Description: This goal is used for patients who can self-report and are not achieving acceptable pain control during activity.  Outcome: Outcome Met, Continue evaluating goal progress toward completion  Goal: Acceptable pain/comfort level is achieved/maintained at rest (based on Pain Behaviors Scale)  Description: This goal is used for patients who are not able to self-report pain and are assessed for pain using the Pain Behaviors Scale  Outcome: Outcome Met, Continue evaluating goal progress toward completion  Goal: # Verbalizes understanding of pain management  Description: Documented in Patient Education Activity  Outcome: Outcome Met, Continue evaluating goal progress toward completion     Problem: Pressure Injury, Risk for  Goal: # Skin remains intact  Outcome: Outcome Met, Continue evaluating goal progress toward completion  Goal: No new pressure injury (PI) development  Outcome: Outcome Met, Continue evaluating goal progress toward completion  Goal: # Verbalizes understanding of PI risk factors and prevention strategies  Description: Document education using the patient education activity.   Outcome: Outcome Met, Continue evaluating goal progress toward completion     Problem: Pressure Injury Actual  Goal: # No deterioration in pressure injury (PI)  Outcome: Outcome Met, Continue evaluating goal progress toward completion  Goal: # Verbalizes pressure injury management  Description: Document education using the patient education activity.  Outcome: Outcome Met, Continue evaluating goal progress toward completion      Problem: Non-Pressure Injury Wound  Goal: # No deterioration in wound  Outcome: Outcome Met, Continue evaluating goal progress toward completion  Goal: # Verbalizes understanding of wound and wound care  Description: If abnormality is a skin tear, avoid using tape on skin including transparent dressings. Document education using the patient education activity.  Outcome: Outcome Met, Continue evaluating goal progress toward completion  Goal: Participates in wound care activities  Outcome: Outcome Met, Continue evaluating goal progress toward completion     Problem: Cellulitis  Goal: Cellulitis improved as evidenced by decreased redness, swelling and pain  Description: Girth measurement may be used to evaluate edema.  Outcome: Outcome Met, Continue evaluating goal progress toward completion     Problem: Impaired Physical Mobility  Goal: # Bed mobility, ambulation, and ADLs are maintained or returned to baseline during hospitalization  Outcome: Outcome Met, Continue evaluating goal progress toward completion      stated

## 2025-07-01 NOTE — ED PROVIDER NOTE - CLINICAL SUMMARY MEDICAL DECISION MAKING FREE TEXT BOX
Pt with intractable back pain and increasing sensation deficits/weakness as per pt  Also  issues, see HPI  pe decreased sensation RLE as described  plan Sacral MR iv iv pain mngt for comfor t , recall NS/spine for eval

## 2025-07-01 NOTE — ED PROVIDER NOTE - OBJECTIVE STATEMENT
Pt here for increasing numbness to RLE, weakness RLE and Increase back pain magaly well  Seen 6/26 for same issue but not as severe, had meds and went home when pain relieved. Returned 6/27, again received pain meds, had MR whichj showed some disc issues L3/L4, L4/L5 and advanced DJD. Took oxy, nsaids and steroids at home without relief. Was supposed to go for EMG 6/28 but did not bc was in too much pain as per pt. Pt was supposed to go to pain mngt for SHERMAN today but felt that it was not going to help him based on escalating symptoms. Was seen by NS who signed off.  Another issue pt has is urinary dribbling and ED for one year. Saw Dr Aiken end of June who told pt that he thought his issues stemmed from sacral spinal regio  no sig med hx  denies cigs and alcohol  NKA Pt here for increasing numbness to LLE, weakness LLE and Increase back pain magaly well  Seen 6/26 for same issue but not as severe, had meds and went home when pain relieved. Returned 6/27, again received pain meds, had MR which showed some disc issues L3/L4, L4/L5 and advanced DJD. Took oxy, nsaids and steroids at home without relief. Was supposed to go for EMG 6/28 but did not bc was in too much pain as per pt. Pt was supposed to go to pain mngt for SHERMAN today but felt that it was not going to help him based on escalating symptoms. Was seen by NS who signed off.  Another issue pt has is urinary dribbling and ED for one year. Saw Dr Aiken end of June who told pt that he thought his issues stemmed from sacral spinal regio  no sig med hx  denies cigs and alcohol  NKA

## 2025-07-01 NOTE — ED CDU PROVIDER INITIAL DAY NOTE - OBJECTIVE STATEMENT
Pt here for increasing numbness to LLE, weakness LLE and Increase back pain magaly well  Seen 6/26 for same issue but not as severe, had meds and went home when pain relieved. Returned 6/27, again received pain meds, had MR which showed some disc issues L3/L4, L4/L5 and advanced DJD. Took oxy, nsaids and steroids at home without relief. Was supposed to go for EMG 6/28 but did not bc was in too much pain as per pt. Pt was supposed to go to pain mngt for SHERMAN today but felt that it was not going to help him based on escalating symptoms. Was seen by NS who signed off.  Another issue pt has is urinary dribbling and ED for one year. Saw Dr Aiken end of June who told pt that he thought his issues stemmed from sacral spinal regio  no sig med hx  denies cigs and alcohol  NKA

## 2025-07-01 NOTE — ED PROVIDER NOTE - PHYSICAL EXAMINATION
General: well appearing, NAD  Head:  NC, AT  Eyes: EOMI, no scleral icterus  Ears: no erythema/drainage  Nose: midline, no bleeding/drainage  Throat: MMM  Cardiac: RRR, no apparent murmurs, no lower extremity edema  Respiratory: CTABL, equal chest wall expansions  Abdomen: soft, ND, NT, no rebound, no guarding, nonperitonitic  MSK/Vascular: full ROM, soft compartments, warm extremities  Neuro: AAOx3, decreased senbsation RLE: decreased sensation medial and lateral prox thigh, distal r medial thigh, weakness 3/5 RLE vs 5/5 LLE   Psych: calm, cooperative, normal affect General: well appearing, NAD  Head:  NC, AT  Eyes: EOMI, no scleral icterus  Ears: no erythema/drainage  Nose: midline, no bleeding/drainage  Throat: MMM  Cardiac: RRR, no apparent murmurs, no lower extremity edema  Respiratory: CTABL, equal chest wall expansions  Abdomen: soft, ND, NT, no rebound, no guarding, nonperitonitic  MSK/Vascular: full ROM, soft compartments, warm extremities  Neuro: AAOx3, decreased senbsation LLE: decreased sensation medial and lateral prox thigh, distal L medial thigh, weakness 3/5 LLE vs 5/5 RLE   Psych: calm, cooperative, normal affect

## 2025-07-01 NOTE — ED ADULT NURSE REASSESSMENT NOTE - NS ED NURSE REASSESS COMMENT FT1
NO post residual void urine noted upon bladder scan, pt denies any difficulty with voiding and states he felt he was able to completely void.

## 2025-07-01 NOTE — ED ADULT TRIAGE NOTE - CHIEF COMPLAINT QUOTE
See Scanned Documents.   Pt walks in for left leg pain. Pt was seen on Friday where MRI showed herniated disk but is having worsening pain since. Pt has numbness/tingling from hip to kneecap.

## 2025-07-02 VITALS
OXYGEN SATURATION: 97 % | TEMPERATURE: 98 F | SYSTOLIC BLOOD PRESSURE: 115 MMHG | HEART RATE: 83 BPM | DIASTOLIC BLOOD PRESSURE: 72 MMHG | RESPIRATION RATE: 16 BRPM

## 2025-07-02 PROCEDURE — 72195 MRI PELVIS W/O DYE: CPT

## 2025-07-02 PROCEDURE — 72148 MRI LUMBAR SPINE W/O DYE: CPT | Mod: 26

## 2025-07-02 PROCEDURE — 72195 MRI PELVIS W/O DYE: CPT | Mod: 26

## 2025-07-02 PROCEDURE — 96375 TX/PRO/DX INJ NEW DRUG ADDON: CPT

## 2025-07-02 PROCEDURE — 36415 COLL VENOUS BLD VENIPUNCTURE: CPT

## 2025-07-02 PROCEDURE — 85025 COMPLETE CBC W/AUTO DIFF WBC: CPT

## 2025-07-02 PROCEDURE — 96374 THER/PROPH/DIAG INJ IV PUSH: CPT

## 2025-07-02 PROCEDURE — G0378: CPT

## 2025-07-02 PROCEDURE — 72148 MRI LUMBAR SPINE W/O DYE: CPT

## 2025-07-02 PROCEDURE — 99239 HOSP IP/OBS DSCHRG MGMT >30: CPT

## 2025-07-02 PROCEDURE — 99284 EMERGENCY DEPT VISIT MOD MDM: CPT | Mod: 25

## 2025-07-02 PROCEDURE — 80053 COMPREHEN METABOLIC PANEL: CPT

## 2025-07-02 RX ADMIN — Medication 600 MILLIGRAM(S): at 02:02

## 2025-07-02 RX ADMIN — Medication 975 MILLIGRAM(S): at 02:02

## 2025-07-02 NOTE — ED CDU PROVIDER SUBSEQUENT DAY NOTE - CLINICAL SUMMARY MEDICAL DECISION MAKING FREE TEXT BOX
50 year old male with no med hx presented to ED c/o low back pain. Was recently seen in ED and had MR showing disc bulges, was planned to f/u with PM outpt however returned for increased pain now pending MR lumbar/sacral/coccyx and PM consult

## 2025-07-02 NOTE — ED ADULT NURSE REASSESSMENT NOTE - NS ED NURSE REASSESS COMMENT FT1
Assumed care of patient at 07:30 from previous RN. Charting as noted. Patient AA&Ox4, resp even/unlabored, presents to ED with lower back pain. At time of assessment, patient denies pain/discomfort, denies CP/SOB. Patient updated on the plan of care, awaiting MRI. Stretcher locked in lowest position, IV site flushed w/ NS. No redness, swelling or pain noted to site. No signs of acute distress noted, safety maintained.

## 2025-07-02 NOTE — ED ADULT NURSE REASSESSMENT NOTE - NS ED NURSE REASSESS COMMENT FT1
RR even and unlabored and VSS. Pt is independently ambulatory to ED. Pt resting comfortably in bed at this time. Offering no complaints and no acute distress noted. Safety maintained, call bell within reach.

## 2025-07-02 NOTE — ED CDU PROVIDER SUBSEQUENT DAY NOTE - PHYSICAL EXAMINATION
Physical Examination:   Gen: NAD, AOx3  Head: NCAT  HEENT: EOMI, oral mucosa moist, normal conjunctiva, neck supple  Lung: no respiratory distress  CV:  Normal perfusion  Abd: soft, NT ND  MSK: + midline lumbar tenderness   Neuro: No focal neurologic deficits  Skin: No rash   Psych: normal affect

## 2025-07-02 NOTE — ED CDU PROVIDER DISPOSITION NOTE - CLINICAL COURSE
50 year old male with no med hx presented to ED c/o low back pain. Was recently seen in ED and had MR showing disc bulges, was planned to f/u with PM outpt however returned for increased pain, repeat MR showing enlarged disc protusion. imaging reviewed by neursx spine team with recs for conservative management including SHERMAN, seen by PM, unable to do SHERMAN today, pt was able to obtain outpatient injxn with PM MD Anaya today 3 pm. advised on further fu with spine/pm. return precautions advised

## 2025-07-02 NOTE — ED CDU PROVIDER SUBSEQUENT DAY NOTE - SHIFT CHANGE DETAILS
pt actively being transported to MR, reports that he had worsening lower back pain, no new accidents or injuries since being evaluated the first time in the ED MRwas seen by neuro sx. initial imaging  :Advanced degenerative disc disease at T11-T12, L3-L4, L4-L5 and L5-S1. PVR 0. moving extremities freely in stretcher.   MR pending and PM consult placed

## 2025-07-02 NOTE — ED CDU PROVIDER DISPOSITION NOTE - CARE PROVIDER_API CALL
Judy Burgess  Neurological Surgery  [address] [place] [id number]  Phone: [phone]  Fax: [phone]  Follow Up Time: Urgent

## 2025-07-02 NOTE — ED ADULT NURSE REASSESSMENT NOTE - NS ED NURSE REASSESS COMMENT FT1
Pt resting comfortably in bed, NAD, on going plan of care. Pt offers no complaints. All safety and fall precautions remain in place.

## 2025-07-02 NOTE — PROGRESS NOTE ADULT - SUPERVISING ATTENDING
I personally reviewed the patient's imaging. History and plan discussed with PA team, agree with above. Patient in MRi when I went to see him.
Jamie Laguna MD

## 2025-07-02 NOTE — PROGRESS NOTE ADULT - ASSESSMENT
49 YO M with back pain, Left leg weakness and numbness.   L spine MRI compared to 5 days ago (6/27) with increased large left paracentral disc extrusion with superior migration at L3-4  PVR 0cc    Plan:  - no neurosurgical intervention at this time  - recommend pain management consult for possible SHERMAN  - pain control    Case and plan discussed with Dr. Burgess.

## 2025-07-02 NOTE — ED CDU PROVIDER DISPOSITION NOTE - NSFOLLOWUPINSTRUCTIONS_ED_ALL_ED_FT
please follow with:   - primary medical doctor ASAP, bring all reports to your follow up visit   - SPINE, call office for follow up appointment   - PAIN MANAGEMENT, appointment today 3pm     seek re-evaluation immediately for new or worsening medical condition      Back Pain    Back pain is very common in adults. The cause of back pain is rarely dangerous and the pain often gets better over time. The cause of your back pain may not be known and may include strain of muscles or ligaments, degeneration of the spinal disks, or arthritis. Occasionally the pain may radiate down your leg(s). Over-the-counter medicines to reduce pain and inflammation are often the most helpful. Stretching and remaining active frequently helps the healing process.     SEEK IMMEDIATE MEDICAL CARE IF YOU HAVE ANY OF THE FOLLOWING SYMPTOMS: bowel or bladder control problems, unusual weakness or numbness in your arms or legs, nausea or vomiting, abdominal pain, fever, dizziness/lightheadedness.

## 2025-07-02 NOTE — ED CDU PROVIDER DISPOSITION NOTE - ATTENDING CONTRIBUTION TO CARE
51 yo M placed in cdu for pain mgmt eval/intervention for intractable low back pain, had mri; pt prefers to do SHERMAN as outpt as he is able to get it done today/faster  vss  no fnd    dc w pmd and pain mgmt f/u  Based on the H&P, I do not suspect any life- / limb- threatening pathology that requires further intervention at this time.

## 2025-07-02 NOTE — ED ADULT NURSE REASSESSMENT NOTE - NS ED NURSE REASSESS COMMENT FT1
Pt laying comfortably in bed. RR even and unlabored and VSS. Pt ambulated independently to bathroom. Offering no complaints at this time and no acute distress noted. Safety maintained, call bell within reach. Pt updated on plan of care; awaiting MRI.

## 2025-07-02 NOTE — PROGRESS NOTE ADULT - SUBJECTIVE AND OBJECTIVE BOX
JOSE MASTERS PROGRESS NOTE  INTERVAL HPI/OVERNIGHT EVENTS:  50y old Male with back pain and Left leg/groin pain. Pain since last visit to ER a little improved but numbness and weakness in left leg increased. + buckling in Left leg at times.   Reports numbness on ant left thigh, knee and medial thigh. Feels weak at the hip.  Denies saddle anesthesia. Symptoms worsen with ambulation and laying flat. Symptoms improve with knees to chest. Urinary and fecal habit unchanged.    Vital Signs Last 24 Hrs  T(C): 36.9 (02 Jul 2025 09:17), Max: 36.9 (01 Jul 2025 16:04)  T(F): 98.4 (02 Jul 2025 09:17), Max: 98.4 (01 Jul 2025 16:04)  HR: 73 (02 Jul 2025 09:17) (55 - 73)  BP: 126/78 (02 Jul 2025 09:17) (108/67 - 126/78)  BP(mean): 81 (01 Jul 2025 23:17) (81 - 81)  RR: 18 (02 Jul 2025 09:17) (16 - 18)  SpO2: 97% (02 Jul 2025 09:17) (97% - 99%)  Parameters below as of 02 Jul 2025 04:25  Patient On (Oxygen Delivery Method): room air    PHYSICAL EXAM:  GENERAL: NAD, well-groomed  HEAD:  Atraumatic, normocephalic  MENTAL STATUS: Awake; Opens eyes spontaneously; Appropriately conversant without aphasia; following commands  CRANIAL NERVES: Facial sensation intact V1-3 distribution b/l. Face symmetric w/ normal eye closure and smile, tongue midline. Hearing grossly intact. Speech clear.   MOTOR: strength 5/5 b/l lower extremities except Left HF and KE 4+/5.   SENSATION: decreased sensation on left ant thigh, and medial calf.     LABS:                        14.0   8.86  )-----------( 227      ( 01 Jul 2025 12:22 )             42.3     07-01    136  |  105  |  25.3[H]  ----------------------------<  100[H]  4.3   |  24.0  |  0.84    Ca    8.8      01 Jul 2025 12:22  TPro  6.5[L]  /  Alb  3.8  /  TBili  0.3[L]  /  DBili  x   /  AST  19  /  ALT  20  /  AlkPhos  54  07-01  Urinalysis Basic - ( 01 Jul 2025 12:22 )  Color: x / Appearance: x / SG: x / pH: x  Gluc: 100 mg/dL / Ketone: x  / Bili: x / Urobili: x   Blood: x / Protein: x / Nitrite: x   Leuk Esterase: x / RBC: x / WBC x   Sq Epi: x / Non Sq Epi: x / Bacteria: x    RADIOLOGY & ADDITIONAL TESTS:  < from: MR Lumbar Spine No Cont (07.02.25 @ 08:43) >  IMPRESSION:  Enlarging large left paracentral disc extrusion with superior migration   at L3-4  --- End of Report ---  < end of copied text >    < from: MR Pelvis No Cont (07.02.25 @ 08:33) >  IMPRESSION: Unremarkable noncontrast MRI of the pelvis.  --- End ofReport ---  < end of copied text >

## 2025-07-02 NOTE — ED CDU PROVIDER DISPOSITION NOTE - PATIENT PORTAL LINK FT
You can access the FollowMyHealth Patient Portal offered by [facility] by registering at the following website: [email or web address]. By joining [facility]’s FollowMyHealth portal, you will also be able to view your health information using other applications (apps) compatible with our [email or web address]

## 2025-07-05 DIAGNOSIS — Z98.890 OTHER SPECIFIED POSTPROCEDURAL STATES: ICD-10-CM

## 2025-07-05 DIAGNOSIS — R20.0 ANESTHESIA OF SKIN: ICD-10-CM

## 2025-07-05 DIAGNOSIS — M54.50 LOW BACK PAIN, UNSPECIFIED: ICD-10-CM

## 2025-07-15 ENCOUNTER — APPOINTMENT (OUTPATIENT)
Dept: NEUROSURGERY | Facility: CLINIC | Age: 50
End: 2025-07-15
Payer: COMMERCIAL

## 2025-07-15 ENCOUNTER — APPOINTMENT (OUTPATIENT)
Dept: NEUROSURGERY | Facility: CLINIC | Age: 50
End: 2025-07-15

## 2025-07-15 VITALS
WEIGHT: 204 LBS | SYSTOLIC BLOOD PRESSURE: 108 MMHG | BODY MASS INDEX: 30.21 KG/M2 | HEART RATE: 90 BPM | DIASTOLIC BLOOD PRESSURE: 71 MMHG | HEIGHT: 69 IN | OXYGEN SATURATION: 97 % | TEMPERATURE: 99.2 F

## 2025-07-15 PROCEDURE — 99204 OFFICE O/P NEW MOD 45 MIN: CPT | Mod: 57

## 2025-07-15 RX ORDER — CLONAZEPAM 0.5 MG/1
0.5 TABLET ORAL
Refills: 0 | Status: ACTIVE | COMMUNITY

## 2025-07-16 ENCOUNTER — INPATIENT (INPATIENT)
Facility: HOSPITAL | Age: 50
LOS: 2 days | Discharge: HOME | DRG: 552 | End: 2025-07-19
Attending: NEUROLOGICAL SURGERY | Admitting: NEUROLOGICAL SURGERY
Payer: COMMERCIAL

## 2025-07-16 VITALS
OXYGEN SATURATION: 97 % | TEMPERATURE: 98 F | RESPIRATION RATE: 16 BRPM | DIASTOLIC BLOOD PRESSURE: 73 MMHG | HEART RATE: 72 BPM | SYSTOLIC BLOOD PRESSURE: 114 MMHG

## 2025-07-16 DIAGNOSIS — M51.16 INTERVERTEBRAL DISC DISORDERS WITH RADICULOPATHY, LUMBAR REGION: ICD-10-CM

## 2025-07-16 DIAGNOSIS — Z98.1 ARTHRODESIS STATUS: Chronic | ICD-10-CM

## 2025-07-16 LAB
A1C WITH ESTIMATED AVERAGE GLUCOSE RESULT: 5.6 % — SIGNIFICANT CHANGE UP (ref 4–5.6)
ALBUMIN SERPL ELPH-MCNC: 4 G/DL — SIGNIFICANT CHANGE UP (ref 3.3–5.2)
ALP SERPL-CCNC: 62 U/L — SIGNIFICANT CHANGE UP (ref 40–120)
ALT FLD-CCNC: 21 U/L — SIGNIFICANT CHANGE UP
ANION GAP SERPL CALC-SCNC: 9 MMOL/L — SIGNIFICANT CHANGE UP (ref 5–17)
APTT BLD: 26.7 SEC — SIGNIFICANT CHANGE UP (ref 26.1–36.8)
AST SERPL-CCNC: 19 U/L — SIGNIFICANT CHANGE UP
BILIRUB SERPL-MCNC: 0.3 MG/DL — LOW (ref 0.4–2)
BLD GP AB SCN SERPL QL: SIGNIFICANT CHANGE UP
BUN SERPL-MCNC: 18.2 MG/DL — SIGNIFICANT CHANGE UP (ref 8–20)
CALCIUM SERPL-MCNC: 9 MG/DL — SIGNIFICANT CHANGE UP (ref 8.4–10.5)
CHLORIDE SERPL-SCNC: 104 MMOL/L — SIGNIFICANT CHANGE UP (ref 96–108)
CO2 SERPL-SCNC: 27 MMOL/L — SIGNIFICANT CHANGE UP (ref 22–29)
CREAT SERPL-MCNC: 0.69 MG/DL — SIGNIFICANT CHANGE UP (ref 0.5–1.3)
EGFR: 113 ML/MIN/1.73M2 — SIGNIFICANT CHANGE UP
EGFR: 113 ML/MIN/1.73M2 — SIGNIFICANT CHANGE UP
ESTIMATED AVERAGE GLUCOSE: 114 MG/DL — SIGNIFICANT CHANGE UP (ref 68–114)
GLUCOSE SERPL-MCNC: 97 MG/DL — SIGNIFICANT CHANGE UP (ref 70–99)
HCT VFR BLD CALC: 44 % — SIGNIFICANT CHANGE UP (ref 39–50)
HGB BLD-MCNC: 14.4 G/DL — SIGNIFICANT CHANGE UP (ref 13–17)
INR BLD: 0.92 RATIO — SIGNIFICANT CHANGE UP (ref 0.85–1.16)
MAGNESIUM SERPL-MCNC: 1.9 MG/DL — SIGNIFICANT CHANGE UP (ref 1.6–2.6)
MCHC RBC-ENTMCNC: 29.1 PG — SIGNIFICANT CHANGE UP (ref 27–34)
MCHC RBC-ENTMCNC: 32.7 G/DL — SIGNIFICANT CHANGE UP (ref 32–36)
MCV RBC AUTO: 88.9 FL — SIGNIFICANT CHANGE UP (ref 80–100)
MRSA PCR RESULT.: SIGNIFICANT CHANGE UP
NRBC # BLD AUTO: 0 K/UL — SIGNIFICANT CHANGE UP (ref 0–0)
NRBC # FLD: 0 K/UL — SIGNIFICANT CHANGE UP (ref 0–0)
NRBC BLD AUTO-RTO: 0 /100 WBCS — SIGNIFICANT CHANGE UP (ref 0–0)
PHOSPHATE SERPL-MCNC: 3.3 MG/DL — SIGNIFICANT CHANGE UP (ref 2.4–4.7)
PLATELET # BLD AUTO: 253 K/UL — SIGNIFICANT CHANGE UP (ref 150–400)
PMV BLD: 9.4 FL — SIGNIFICANT CHANGE UP (ref 7–13)
POTASSIUM SERPL-MCNC: 4.1 MMOL/L — SIGNIFICANT CHANGE UP (ref 3.5–5.3)
POTASSIUM SERPL-SCNC: 4.1 MMOL/L — SIGNIFICANT CHANGE UP (ref 3.5–5.3)
PROT SERPL-MCNC: 6.7 G/DL — SIGNIFICANT CHANGE UP (ref 6.6–8.7)
PROTHROM AB SERPL-ACNC: 10.4 SEC — SIGNIFICANT CHANGE UP (ref 9.9–13.4)
RBC # BLD: 4.95 M/UL — SIGNIFICANT CHANGE UP (ref 4.2–5.8)
RBC # FLD: 13.2 % — SIGNIFICANT CHANGE UP (ref 10.3–14.5)
S AUREUS DNA NOSE QL NAA+PROBE: SIGNIFICANT CHANGE UP
SODIUM SERPL-SCNC: 140 MMOL/L — SIGNIFICANT CHANGE UP (ref 135–145)
WBC # BLD: 9.5 K/UL — SIGNIFICANT CHANGE UP (ref 3.8–10.5)
WBC # FLD AUTO: 9.5 K/UL — SIGNIFICANT CHANGE UP (ref 3.8–10.5)

## 2025-07-16 PROCEDURE — 86900 BLOOD TYPING SEROLOGIC ABO: CPT

## 2025-07-16 PROCEDURE — 72146 MRI CHEST SPINE W/O DYE: CPT | Mod: 26

## 2025-07-16 PROCEDURE — 99222 1ST HOSP IP/OBS MODERATE 55: CPT

## 2025-07-16 PROCEDURE — 72141 MRI NECK SPINE W/O DYE: CPT | Mod: 26

## 2025-07-16 PROCEDURE — 87641 MR-STAPH DNA AMP PROBE: CPT

## 2025-07-16 PROCEDURE — 72148 MRI LUMBAR SPINE W/O DYE: CPT

## 2025-07-16 PROCEDURE — 72146 MRI CHEST SPINE W/O DYE: CPT

## 2025-07-16 PROCEDURE — 72148 MRI LUMBAR SPINE W/O DYE: CPT | Mod: 26

## 2025-07-16 PROCEDURE — 86850 RBC ANTIBODY SCREEN: CPT

## 2025-07-16 PROCEDURE — 36415 COLL VENOUS BLD VENIPUNCTURE: CPT

## 2025-07-16 PROCEDURE — 83735 ASSAY OF MAGNESIUM: CPT

## 2025-07-16 PROCEDURE — 84100 ASSAY OF PHOSPHORUS: CPT

## 2025-07-16 PROCEDURE — 83036 HEMOGLOBIN GLYCOSYLATED A1C: CPT

## 2025-07-16 PROCEDURE — 87640 STAPH A DNA AMP PROBE: CPT

## 2025-07-16 PROCEDURE — 93010 ELECTROCARDIOGRAM REPORT: CPT

## 2025-07-16 PROCEDURE — 93005 ELECTROCARDIOGRAM TRACING: CPT

## 2025-07-16 PROCEDURE — 85610 PROTHROMBIN TIME: CPT

## 2025-07-16 PROCEDURE — 80053 COMPREHEN METABOLIC PANEL: CPT

## 2025-07-16 PROCEDURE — 85730 THROMBOPLASTIN TIME PARTIAL: CPT

## 2025-07-16 PROCEDURE — 72141 MRI NECK SPINE W/O DYE: CPT

## 2025-07-16 PROCEDURE — 85027 COMPLETE CBC AUTOMATED: CPT

## 2025-07-16 PROCEDURE — 86901 BLOOD TYPING SEROLOGIC RH(D): CPT

## 2025-07-16 RX ORDER — ACETAMINOPHEN 500 MG/5ML
650 LIQUID (ML) ORAL EVERY 6 HOURS
Refills: 0 | Status: DISCONTINUED | OUTPATIENT
Start: 2025-07-16 | End: 2025-07-18

## 2025-07-16 RX ORDER — SENNA 187 MG
2 TABLET ORAL AT BEDTIME
Refills: 0 | Status: DISCONTINUED | OUTPATIENT
Start: 2025-07-16 | End: 2025-07-18

## 2025-07-16 RX ORDER — POVIDONE-IODINE 7.5 %
1 SOLUTION, NON-ORAL TOPICAL ONCE
Refills: 0 | Status: COMPLETED | OUTPATIENT
Start: 2025-07-18 | End: 2025-07-18

## 2025-07-16 RX ORDER — POLYETHYLENE GLYCOL 3350 17 G/17G
17 POWDER, FOR SOLUTION ORAL DAILY
Refills: 0 | Status: DISCONTINUED | OUTPATIENT
Start: 2025-07-16 | End: 2025-07-18

## 2025-07-16 RX ADMIN — POLYETHYLENE GLYCOL 3350 17 GRAM(S): 17 POWDER, FOR SOLUTION ORAL at 17:37

## 2025-07-16 RX ADMIN — Medication 2 TABLET(S): at 21:52

## 2025-07-16 RX ADMIN — Medication 15 MILLILITER(S): at 17:33

## 2025-07-16 RX ADMIN — Medication 1 APPLICATION(S): at 17:33

## 2025-07-17 LAB
ABO RH CONFIRMATION: SIGNIFICANT CHANGE UP
ANION GAP SERPL CALC-SCNC: 12 MMOL/L — SIGNIFICANT CHANGE UP (ref 5–17)
BUN SERPL-MCNC: 13.4 MG/DL — SIGNIFICANT CHANGE UP (ref 8–20)
CALCIUM SERPL-MCNC: 8.9 MG/DL — SIGNIFICANT CHANGE UP (ref 8.4–10.5)
CHLORIDE SERPL-SCNC: 104 MMOL/L — SIGNIFICANT CHANGE UP (ref 96–108)
CO2 SERPL-SCNC: 23 MMOL/L — SIGNIFICANT CHANGE UP (ref 22–29)
CREAT SERPL-MCNC: 0.73 MG/DL — SIGNIFICANT CHANGE UP (ref 0.5–1.3)
EGFR: 112 ML/MIN/1.73M2 — SIGNIFICANT CHANGE UP
EGFR: 112 ML/MIN/1.73M2 — SIGNIFICANT CHANGE UP
GLUCOSE SERPL-MCNC: 86 MG/DL — SIGNIFICANT CHANGE UP (ref 70–99)
HCT VFR BLD CALC: 44.3 % — SIGNIFICANT CHANGE UP (ref 39–50)
HGB BLD-MCNC: 14.4 G/DL — SIGNIFICANT CHANGE UP (ref 13–17)
MAGNESIUM SERPL-MCNC: 1.9 MG/DL — SIGNIFICANT CHANGE UP (ref 1.6–2.6)
MCHC RBC-ENTMCNC: 28.7 PG — SIGNIFICANT CHANGE UP (ref 27–34)
MCHC RBC-ENTMCNC: 32.5 G/DL — SIGNIFICANT CHANGE UP (ref 32–36)
MCV RBC AUTO: 88.2 FL — SIGNIFICANT CHANGE UP (ref 80–100)
NRBC # BLD AUTO: 0 K/UL — SIGNIFICANT CHANGE UP (ref 0–0)
NRBC # FLD: 0 K/UL — SIGNIFICANT CHANGE UP (ref 0–0)
NRBC BLD AUTO-RTO: 0 /100 WBCS — SIGNIFICANT CHANGE UP (ref 0–0)
PHOSPHATE SERPL-MCNC: 3.7 MG/DL — SIGNIFICANT CHANGE UP (ref 2.4–4.7)
PLATELET # BLD AUTO: 269 K/UL — SIGNIFICANT CHANGE UP (ref 150–400)
PMV BLD: 9.9 FL — SIGNIFICANT CHANGE UP (ref 7–13)
POTASSIUM SERPL-MCNC: 4.4 MMOL/L — SIGNIFICANT CHANGE UP (ref 3.5–5.3)
POTASSIUM SERPL-SCNC: 4.4 MMOL/L — SIGNIFICANT CHANGE UP (ref 3.5–5.3)
RBC # BLD: 5.02 M/UL — SIGNIFICANT CHANGE UP (ref 4.2–5.8)
RBC # FLD: 13.3 % — SIGNIFICANT CHANGE UP (ref 10.3–14.5)
SODIUM SERPL-SCNC: 139 MMOL/L — SIGNIFICANT CHANGE UP (ref 135–145)
WBC # BLD: 8.48 K/UL — SIGNIFICANT CHANGE UP (ref 3.8–10.5)
WBC # FLD AUTO: 8.48 K/UL — SIGNIFICANT CHANGE UP (ref 3.8–10.5)

## 2025-07-17 PROCEDURE — 93005 ELECTROCARDIOGRAM TRACING: CPT

## 2025-07-17 PROCEDURE — 72141 MRI NECK SPINE W/O DYE: CPT

## 2025-07-17 PROCEDURE — 85730 THROMBOPLASTIN TIME PARTIAL: CPT

## 2025-07-17 PROCEDURE — 99232 SBSQ HOSP IP/OBS MODERATE 35: CPT

## 2025-07-17 PROCEDURE — 85610 PROTHROMBIN TIME: CPT

## 2025-07-17 PROCEDURE — 72146 MRI CHEST SPINE W/O DYE: CPT

## 2025-07-17 PROCEDURE — 87640 STAPH A DNA AMP PROBE: CPT

## 2025-07-17 PROCEDURE — 84100 ASSAY OF PHOSPHORUS: CPT

## 2025-07-17 PROCEDURE — 87641 MR-STAPH DNA AMP PROBE: CPT

## 2025-07-17 PROCEDURE — 83036 HEMOGLOBIN GLYCOSYLATED A1C: CPT

## 2025-07-17 PROCEDURE — 86901 BLOOD TYPING SEROLOGIC RH(D): CPT

## 2025-07-17 PROCEDURE — 93970 EXTREMITY STUDY: CPT | Mod: 26

## 2025-07-17 PROCEDURE — 93970 EXTREMITY STUDY: CPT

## 2025-07-17 PROCEDURE — 72148 MRI LUMBAR SPINE W/O DYE: CPT

## 2025-07-17 PROCEDURE — 83735 ASSAY OF MAGNESIUM: CPT

## 2025-07-17 PROCEDURE — 80048 BASIC METABOLIC PNL TOTAL CA: CPT

## 2025-07-17 PROCEDURE — 86850 RBC ANTIBODY SCREEN: CPT

## 2025-07-17 PROCEDURE — 80053 COMPREHEN METABOLIC PANEL: CPT

## 2025-07-17 PROCEDURE — 86900 BLOOD TYPING SEROLOGIC ABO: CPT

## 2025-07-17 PROCEDURE — 85027 COMPLETE CBC AUTOMATED: CPT

## 2025-07-17 PROCEDURE — 36415 COLL VENOUS BLD VENIPUNCTURE: CPT

## 2025-07-17 RX ORDER — METHOCARBAMOL 500 MG/1
750 TABLET, FILM COATED ORAL EVERY 8 HOURS
Refills: 0 | Status: DISCONTINUED | OUTPATIENT
Start: 2025-07-17 | End: 2025-07-18

## 2025-07-17 RX ORDER — CEFAZOLIN SODIUM IN 0.9 % NACL 3 G/100 ML
2000 INTRAVENOUS SOLUTION, PIGGYBACK (ML) INTRAVENOUS ONCE
Refills: 0 | Status: DISCONTINUED | OUTPATIENT
Start: 2025-07-17 | End: 2025-07-17

## 2025-07-17 RX ORDER — DEXAMETHASONE 0.5 MG/1
4 TABLET ORAL EVERY 6 HOURS
Refills: 0 | Status: DISCONTINUED | OUTPATIENT
Start: 2025-07-17 | End: 2025-07-18

## 2025-07-17 RX ORDER — CEFAZOLIN SODIUM IN 0.9 % NACL 3 G/100 ML
2000 INTRAVENOUS SOLUTION, PIGGYBACK (ML) INTRAVENOUS ONCE
Refills: 0 | Status: DISCONTINUED | OUTPATIENT
Start: 2025-07-18 | End: 2025-07-18

## 2025-07-17 RX ORDER — OXYCODONE HYDROCHLORIDE 30 MG/1
5 TABLET ORAL EVERY 4 HOURS
Refills: 0 | Status: DISCONTINUED | OUTPATIENT
Start: 2025-07-17 | End: 2025-07-18

## 2025-07-17 RX ORDER — B1/B2/B3/B5/B6/B12/VIT C/FOLIC 500-0.5 MG
1 TABLET ORAL DAILY
Refills: 0 | Status: DISCONTINUED | OUTPATIENT
Start: 2025-07-17 | End: 2025-07-18

## 2025-07-17 RX ADMIN — POLYETHYLENE GLYCOL 3350 17 GRAM(S): 17 POWDER, FOR SOLUTION ORAL at 13:29

## 2025-07-17 RX ADMIN — Medication 15 MILLILITER(S): at 17:50

## 2025-07-17 RX ADMIN — Medication 500 MILLIGRAM(S): at 13:29

## 2025-07-17 RX ADMIN — Medication 15 MILLILITER(S): at 04:53

## 2025-07-17 RX ADMIN — OXYCODONE HYDROCHLORIDE 5 MILLIGRAM(S): 30 TABLET ORAL at 23:59

## 2025-07-17 RX ADMIN — Medication 1 TABLET(S): at 13:29

## 2025-07-17 RX ADMIN — Medication 2 TABLET(S): at 21:13

## 2025-07-17 RX ADMIN — Medication 1 APPLICATION(S): at 13:33

## 2025-07-17 RX ADMIN — Medication 650 MILLIGRAM(S): at 14:28

## 2025-07-17 RX ADMIN — OXYCODONE HYDROCHLORIDE 5 MILLIGRAM(S): 30 TABLET ORAL at 04:53

## 2025-07-17 RX ADMIN — Medication 650 MILLIGRAM(S): at 05:20

## 2025-07-17 RX ADMIN — Medication 650 MILLIGRAM(S): at 13:28

## 2025-07-17 RX ADMIN — Medication 650 MILLIGRAM(S): at 04:20

## 2025-07-17 RX ADMIN — DEXAMETHASONE 4 MILLIGRAM(S): 0.5 TABLET ORAL at 17:49

## 2025-07-17 RX ADMIN — Medication 1 APPLICATION(S): at 21:13

## 2025-07-17 RX ADMIN — DEXAMETHASONE 4 MILLIGRAM(S): 0.5 TABLET ORAL at 23:58

## 2025-07-17 RX ADMIN — OXYCODONE HYDROCHLORIDE 5 MILLIGRAM(S): 30 TABLET ORAL at 05:53

## 2025-07-18 ENCOUNTER — TRANSCRIPTION ENCOUNTER (OUTPATIENT)
Age: 50
End: 2025-07-18

## 2025-07-18 ENCOUNTER — RESULT REVIEW (OUTPATIENT)
Age: 50
End: 2025-07-18

## 2025-07-18 ENCOUNTER — APPOINTMENT (OUTPATIENT)
Dept: NEUROSURGERY | Facility: HOSPITAL | Age: 50
End: 2025-07-18

## 2025-07-18 LAB
ANION GAP SERPL CALC-SCNC: 10 MMOL/L — SIGNIFICANT CHANGE UP (ref 5–17)
APTT BLD: 29.3 SEC — SIGNIFICANT CHANGE UP (ref 26.1–36.8)
BUN SERPL-MCNC: 12.9 MG/DL — SIGNIFICANT CHANGE UP (ref 8–20)
CALCIUM SERPL-MCNC: 9.5 MG/DL — SIGNIFICANT CHANGE UP (ref 8.4–10.5)
CHLORIDE SERPL-SCNC: 104 MMOL/L — SIGNIFICANT CHANGE UP (ref 96–108)
CO2 SERPL-SCNC: 25 MMOL/L — SIGNIFICANT CHANGE UP (ref 22–29)
CREAT SERPL-MCNC: 0.72 MG/DL — SIGNIFICANT CHANGE UP (ref 0.5–1.3)
EGFR: 112 ML/MIN/1.73M2 — SIGNIFICANT CHANGE UP
EGFR: 112 ML/MIN/1.73M2 — SIGNIFICANT CHANGE UP
GLUCOSE BLDC GLUCOMTR-MCNC: 121 MG/DL — HIGH (ref 70–99)
GLUCOSE BLDC GLUCOMTR-MCNC: 99 MG/DL — SIGNIFICANT CHANGE UP (ref 70–99)
GLUCOSE SERPL-MCNC: 148 MG/DL — HIGH (ref 70–99)
HCT VFR BLD CALC: 45.5 % — SIGNIFICANT CHANGE UP (ref 39–50)
HGB BLD-MCNC: 15.1 G/DL — SIGNIFICANT CHANGE UP (ref 13–17)
INR BLD: 1.04 RATIO — SIGNIFICANT CHANGE UP (ref 0.85–1.16)
MAGNESIUM SERPL-MCNC: 1.9 MG/DL — SIGNIFICANT CHANGE UP (ref 1.6–2.6)
MCHC RBC-ENTMCNC: 28.5 PG — SIGNIFICANT CHANGE UP (ref 27–34)
MCHC RBC-ENTMCNC: 33.2 G/DL — SIGNIFICANT CHANGE UP (ref 32–36)
MCV RBC AUTO: 85.8 FL — SIGNIFICANT CHANGE UP (ref 80–100)
NRBC # BLD AUTO: 0 K/UL — SIGNIFICANT CHANGE UP (ref 0–0)
NRBC # FLD: 0 K/UL — SIGNIFICANT CHANGE UP (ref 0–0)
NRBC BLD AUTO-RTO: 0 /100 WBCS — SIGNIFICANT CHANGE UP (ref 0–0)
PHOSPHATE SERPL-MCNC: 2.8 MG/DL — SIGNIFICANT CHANGE UP (ref 2.4–4.7)
PLATELET # BLD AUTO: 281 K/UL — SIGNIFICANT CHANGE UP (ref 150–400)
PMV BLD: 10 FL — SIGNIFICANT CHANGE UP (ref 7–13)
POTASSIUM SERPL-MCNC: 4.7 MMOL/L — SIGNIFICANT CHANGE UP (ref 3.5–5.3)
POTASSIUM SERPL-SCNC: 4.7 MMOL/L — SIGNIFICANT CHANGE UP (ref 3.5–5.3)
PROTHROM AB SERPL-ACNC: 11.7 SEC — SIGNIFICANT CHANGE UP (ref 9.9–13.4)
RBC # BLD: 5.3 M/UL — SIGNIFICANT CHANGE UP (ref 4.2–5.8)
RBC # FLD: 12.8 % — SIGNIFICANT CHANGE UP (ref 10.3–14.5)
SODIUM SERPL-SCNC: 139 MMOL/L — SIGNIFICANT CHANGE UP (ref 135–145)
WBC # BLD: 6.64 K/UL — SIGNIFICANT CHANGE UP (ref 3.8–10.5)
WBC # FLD AUTO: 6.64 K/UL — SIGNIFICANT CHANGE UP (ref 3.8–10.5)

## 2025-07-18 PROCEDURE — 93005 ELECTROCARDIOGRAM TRACING: CPT

## 2025-07-18 PROCEDURE — 63030 LAMOT DCMPRN NRV RT 1 LMBR: CPT | Mod: LT

## 2025-07-18 PROCEDURE — 86900 BLOOD TYPING SEROLOGIC ABO: CPT

## 2025-07-18 PROCEDURE — 86850 RBC ANTIBODY SCREEN: CPT

## 2025-07-18 PROCEDURE — 85027 COMPLETE CBC AUTOMATED: CPT

## 2025-07-18 PROCEDURE — 72141 MRI NECK SPINE W/O DYE: CPT

## 2025-07-18 PROCEDURE — 80053 COMPREHEN METABOLIC PANEL: CPT

## 2025-07-18 PROCEDURE — 72146 MRI CHEST SPINE W/O DYE: CPT

## 2025-07-18 PROCEDURE — 82962 GLUCOSE BLOOD TEST: CPT

## 2025-07-18 PROCEDURE — 93970 EXTREMITY STUDY: CPT

## 2025-07-18 PROCEDURE — 87641 MR-STAPH DNA AMP PROBE: CPT

## 2025-07-18 PROCEDURE — 85730 THROMBOPLASTIN TIME PARTIAL: CPT

## 2025-07-18 PROCEDURE — 80048 BASIC METABOLIC PNL TOTAL CA: CPT

## 2025-07-18 PROCEDURE — 99232 SBSQ HOSP IP/OBS MODERATE 35: CPT

## 2025-07-18 PROCEDURE — 36415 COLL VENOUS BLD VENIPUNCTURE: CPT

## 2025-07-18 PROCEDURE — 86901 BLOOD TYPING SEROLOGIC RH(D): CPT

## 2025-07-18 PROCEDURE — 76000 FLUOROSCOPY <1 HR PHYS/QHP: CPT

## 2025-07-18 PROCEDURE — 72148 MRI LUMBAR SPINE W/O DYE: CPT

## 2025-07-18 PROCEDURE — 83036 HEMOGLOBIN GLYCOSYLATED A1C: CPT

## 2025-07-18 PROCEDURE — 85610 PROTHROMBIN TIME: CPT

## 2025-07-18 PROCEDURE — 83735 ASSAY OF MAGNESIUM: CPT

## 2025-07-18 PROCEDURE — 84100 ASSAY OF PHOSPHORUS: CPT

## 2025-07-18 PROCEDURE — 88304 TISSUE EXAM BY PATHOLOGIST: CPT | Mod: 26

## 2025-07-18 PROCEDURE — 87640 STAPH A DNA AMP PROBE: CPT

## 2025-07-18 DEVICE — SURGIFOAM 8 X 12.5CM X 10MM (100): Type: IMPLANTABLE DEVICE | Site: LEFT | Status: FUNCTIONAL

## 2025-07-18 DEVICE — SURGIFLO MATRIX WITH THROMBIN KIT: Type: IMPLANTABLE DEVICE | Site: LEFT | Status: FUNCTIONAL

## 2025-07-18 RX ORDER — HYDROMORPHONE/SOD CHLOR,ISO/PF 2 MG/10 ML
1 SYRINGE (ML) INJECTION
Refills: 0 | Status: DISCONTINUED | OUTPATIENT
Start: 2025-07-18 | End: 2025-07-18

## 2025-07-18 RX ORDER — ACETAMINOPHEN 500 MG/5ML
1000 LIQUID (ML) ORAL ONCE
Refills: 0 | Status: DISCONTINUED | OUTPATIENT
Start: 2025-07-18 | End: 2025-07-19

## 2025-07-18 RX ORDER — INSULIN LISPRO 100 U/ML
INJECTION, SOLUTION INTRAVENOUS; SUBCUTANEOUS
Refills: 0 | Status: DISCONTINUED | OUTPATIENT
Start: 2025-07-18 | End: 2025-07-19

## 2025-07-18 RX ORDER — CEFAZOLIN SODIUM IN 0.9 % NACL 3 G/100 ML
2000 INTRAVENOUS SOLUTION, PIGGYBACK (ML) INTRAVENOUS EVERY 8 HOURS
Refills: 0 | Status: COMPLETED | OUTPATIENT
Start: 2025-07-18 | End: 2025-07-19

## 2025-07-18 RX ORDER — ONDANSETRON HCL/PF 4 MG/2 ML
4 VIAL (ML) INJECTION EVERY 6 HOURS
Refills: 0 | Status: DISCONTINUED | OUTPATIENT
Start: 2025-07-18 | End: 2025-07-19

## 2025-07-18 RX ORDER — B1/B2/B3/B5/B6/B12/VIT C/FOLIC 500-0.5 MG
1 TABLET ORAL DAILY
Refills: 0 | Status: DISCONTINUED | OUTPATIENT
Start: 2025-07-18 | End: 2025-07-19

## 2025-07-18 RX ORDER — DIPHENHYDRAMINE HCL 12.5MG/5ML
12.5 ELIXIR ORAL EVERY 4 HOURS
Refills: 0 | Status: DISCONTINUED | OUTPATIENT
Start: 2025-07-18 | End: 2025-07-19

## 2025-07-18 RX ORDER — OXYCODONE HYDROCHLORIDE 30 MG/1
5 TABLET ORAL EVERY 4 HOURS
Refills: 0 | Status: DISCONTINUED | OUTPATIENT
Start: 2025-07-18 | End: 2025-07-19

## 2025-07-18 RX ORDER — HYDROMORPHONE/SOD CHLOR,ISO/PF 2 MG/10 ML
0.5 SYRINGE (ML) INJECTION
Refills: 0 | Status: DISCONTINUED | OUTPATIENT
Start: 2025-07-18 | End: 2025-07-18

## 2025-07-18 RX ORDER — SODIUM CHLORIDE 9 G/1000ML
1000 INJECTION, SOLUTION INTRAVENOUS
Refills: 0 | Status: DISCONTINUED | OUTPATIENT
Start: 2025-07-18 | End: 2025-07-18

## 2025-07-18 RX ORDER — DEXTROSE 50 % IN WATER 50 %
12.5 SYRINGE (ML) INTRAVENOUS ONCE
Refills: 0 | Status: DISCONTINUED | OUTPATIENT
Start: 2025-07-18 | End: 2025-07-19

## 2025-07-18 RX ORDER — DIAZEPAM 5 MG/1
5 TABLET ORAL EVERY 12 HOURS
Refills: 0 | Status: DISCONTINUED | OUTPATIENT
Start: 2025-07-18 | End: 2025-07-19

## 2025-07-18 RX ORDER — DEXTROSE 50 % IN WATER 50 %
25 SYRINGE (ML) INTRAVENOUS ONCE
Refills: 0 | Status: DISCONTINUED | OUTPATIENT
Start: 2025-07-18 | End: 2025-07-19

## 2025-07-18 RX ORDER — ONDANSETRON HCL/PF 4 MG/2 ML
4 VIAL (ML) INJECTION ONCE
Refills: 0 | Status: DISCONTINUED | OUTPATIENT
Start: 2025-07-18 | End: 2025-07-18

## 2025-07-18 RX ORDER — SODIUM CHLORIDE 9 G/1000ML
1000 INJECTION, SOLUTION INTRAVENOUS
Refills: 0 | Status: DISCONTINUED | OUTPATIENT
Start: 2025-07-18 | End: 2025-07-19

## 2025-07-18 RX ORDER — OXYCODONE HYDROCHLORIDE 30 MG/1
10 TABLET ORAL EVERY 4 HOURS
Refills: 0 | Status: DISCONTINUED | OUTPATIENT
Start: 2025-07-18 | End: 2025-07-19

## 2025-07-18 RX ORDER — POLYETHYLENE GLYCOL 3350 17 G/17G
17 POWDER, FOR SOLUTION ORAL DAILY
Refills: 0 | Status: DISCONTINUED | OUTPATIENT
Start: 2025-07-18 | End: 2025-07-19

## 2025-07-18 RX ORDER — DEXAMETHASONE 0.5 MG/1
4 TABLET ORAL EVERY 6 HOURS
Refills: 0 | Status: DISCONTINUED | OUTPATIENT
Start: 2025-07-18 | End: 2025-07-18

## 2025-07-18 RX ORDER — CEFAZOLIN SODIUM IN 0.9 % NACL 3 G/100 ML
2000 INTRAVENOUS SOLUTION, PIGGYBACK (ML) INTRAVENOUS EVERY 8 HOURS
Refills: 0 | Status: DISCONTINUED | OUTPATIENT
Start: 2025-07-18 | End: 2025-07-18

## 2025-07-18 RX ORDER — HYDROMORPHONE/SOD CHLOR,ISO/PF 2 MG/10 ML
0.5 SYRINGE (ML) INJECTION EVERY 4 HOURS
Refills: 0 | Status: DISCONTINUED | OUTPATIENT
Start: 2025-07-18 | End: 2025-07-18

## 2025-07-18 RX ORDER — INSULIN LISPRO 100 U/ML
INJECTION, SOLUTION INTRAVENOUS; SUBCUTANEOUS AT BEDTIME
Refills: 0 | Status: DISCONTINUED | OUTPATIENT
Start: 2025-07-18 | End: 2025-07-19

## 2025-07-18 RX ORDER — SENNA 187 MG
2 TABLET ORAL AT BEDTIME
Refills: 0 | Status: DISCONTINUED | OUTPATIENT
Start: 2025-07-18 | End: 2025-07-19

## 2025-07-18 RX ORDER — OXYCODONE HYDROCHLORIDE 30 MG/1
5 TABLET ORAL EVERY 4 HOURS
Refills: 0 | Status: DISCONTINUED | OUTPATIENT
Start: 2025-07-18 | End: 2025-07-18

## 2025-07-18 RX ORDER — METHOCARBAMOL 500 MG/1
750 TABLET, FILM COATED ORAL EVERY 8 HOURS
Refills: 0 | Status: DISCONTINUED | OUTPATIENT
Start: 2025-07-18 | End: 2025-07-19

## 2025-07-18 RX ORDER — DEXTROSE 50 % IN WATER 50 %
15 SYRINGE (ML) INTRAVENOUS ONCE
Refills: 0 | Status: DISCONTINUED | OUTPATIENT
Start: 2025-07-18 | End: 2025-07-19

## 2025-07-18 RX ORDER — HYDROMORPHONE/SOD CHLOR,ISO/PF 2 MG/10 ML
0.5 SYRINGE (ML) INJECTION EVERY 4 HOURS
Refills: 0 | Status: DISCONTINUED | OUTPATIENT
Start: 2025-07-18 | End: 2025-07-19

## 2025-07-18 RX ORDER — DEXAMETHASONE 0.5 MG/1
4 TABLET ORAL EVERY 6 HOURS
Refills: 0 | Status: DISCONTINUED | OUTPATIENT
Start: 2025-07-18 | End: 2025-07-19

## 2025-07-18 RX ORDER — GLUCAGON 3 MG/1
1 POWDER NASAL ONCE
Refills: 0 | Status: DISCONTINUED | OUTPATIENT
Start: 2025-07-18 | End: 2025-07-19

## 2025-07-18 RX ADMIN — OXYCODONE HYDROCHLORIDE 5 MILLIGRAM(S): 30 TABLET ORAL at 00:59

## 2025-07-18 RX ADMIN — Medication 1 APPLICATION(S): at 11:48

## 2025-07-18 RX ADMIN — Medication 2 TABLET(S): at 23:14

## 2025-07-18 RX ADMIN — Medication 2000 MILLIGRAM(S): at 20:32

## 2025-07-18 RX ADMIN — DEXAMETHASONE 4 MILLIGRAM(S): 0.5 TABLET ORAL at 18:33

## 2025-07-18 RX ADMIN — METHOCARBAMOL 750 MILLIGRAM(S): 500 TABLET, FILM COATED ORAL at 20:49

## 2025-07-18 RX ADMIN — Medication 40 MILLIGRAM(S): at 06:01

## 2025-07-18 RX ADMIN — Medication 3 MILLILITER(S): at 22:00

## 2025-07-18 RX ADMIN — Medication 15 MILLILITER(S): at 06:03

## 2025-07-18 RX ADMIN — Medication 15 MILLILITER(S): at 18:45

## 2025-07-18 RX ADMIN — Medication 0.5 MILLIGRAM(S): at 16:55

## 2025-07-18 RX ADMIN — DEXAMETHASONE 4 MILLIGRAM(S): 0.5 TABLET ORAL at 06:02

## 2025-07-18 RX ADMIN — OXYCODONE HYDROCHLORIDE 5 MILLIGRAM(S): 30 TABLET ORAL at 20:30

## 2025-07-18 RX ADMIN — Medication 1 APPLICATION(S): at 10:00

## 2025-07-18 RX ADMIN — METHOCARBAMOL 750 MILLIGRAM(S): 500 TABLET, FILM COATED ORAL at 06:01

## 2025-07-18 RX ADMIN — OXYCODONE HYDROCHLORIDE 5 MILLIGRAM(S): 30 TABLET ORAL at 21:30

## 2025-07-18 RX ADMIN — Medication 0.5 MILLIGRAM(S): at 16:40

## 2025-07-19 ENCOUNTER — TRANSCRIPTION ENCOUNTER (OUTPATIENT)
Age: 50
End: 2025-07-19

## 2025-07-19 VITALS
RESPIRATION RATE: 18 BRPM | TEMPERATURE: 98 F | SYSTOLIC BLOOD PRESSURE: 117 MMHG | HEART RATE: 69 BPM | OXYGEN SATURATION: 93 % | DIASTOLIC BLOOD PRESSURE: 73 MMHG

## 2025-07-19 LAB
ALBUMIN SERPL ELPH-MCNC: 3.7 G/DL — SIGNIFICANT CHANGE UP (ref 3.3–5.2)
ALP SERPL-CCNC: 57 U/L — SIGNIFICANT CHANGE UP (ref 40–120)
ALT FLD-CCNC: 25 U/L — SIGNIFICANT CHANGE UP
ANION GAP SERPL CALC-SCNC: 16 MMOL/L — SIGNIFICANT CHANGE UP (ref 5–17)
AST SERPL-CCNC: 27 U/L — SIGNIFICANT CHANGE UP
BILIRUB SERPL-MCNC: 0.5 MG/DL — SIGNIFICANT CHANGE UP (ref 0.4–2)
BUN SERPL-MCNC: 16.7 MG/DL — SIGNIFICANT CHANGE UP (ref 8–20)
CALCIUM SERPL-MCNC: 9 MG/DL — SIGNIFICANT CHANGE UP (ref 8.4–10.5)
CHLORIDE SERPL-SCNC: 101 MMOL/L — SIGNIFICANT CHANGE UP (ref 96–108)
CO2 SERPL-SCNC: 21 MMOL/L — LOW (ref 22–29)
CREAT SERPL-MCNC: 0.84 MG/DL — SIGNIFICANT CHANGE UP (ref 0.5–1.3)
EGFR: 107 ML/MIN/1.73M2 — SIGNIFICANT CHANGE UP
EGFR: 107 ML/MIN/1.73M2 — SIGNIFICANT CHANGE UP
GLUCOSE BLDC GLUCOMTR-MCNC: 137 MG/DL — HIGH (ref 70–99)
GLUCOSE BLDC GLUCOMTR-MCNC: 162 MG/DL — HIGH (ref 70–99)
GLUCOSE SERPL-MCNC: 139 MG/DL — HIGH (ref 70–99)
HCT VFR BLD CALC: 42.6 % — SIGNIFICANT CHANGE UP (ref 39–50)
HGB BLD-MCNC: 14 G/DL — SIGNIFICANT CHANGE UP (ref 13–17)
MAGNESIUM SERPL-MCNC: 1.9 MG/DL — SIGNIFICANT CHANGE UP (ref 1.6–2.6)
MCHC RBC-ENTMCNC: 28.6 PG — SIGNIFICANT CHANGE UP (ref 27–34)
MCHC RBC-ENTMCNC: 32.9 G/DL — SIGNIFICANT CHANGE UP (ref 32–36)
MCV RBC AUTO: 87.1 FL — SIGNIFICANT CHANGE UP (ref 80–100)
NRBC # BLD AUTO: 0 K/UL — SIGNIFICANT CHANGE UP (ref 0–0)
NRBC # FLD: 0 K/UL — SIGNIFICANT CHANGE UP (ref 0–0)
NRBC BLD AUTO-RTO: 0 /100 WBCS — SIGNIFICANT CHANGE UP (ref 0–0)
PHOSPHATE SERPL-MCNC: 3.8 MG/DL — SIGNIFICANT CHANGE UP (ref 2.4–4.7)
PLATELET # BLD AUTO: 291 K/UL — SIGNIFICANT CHANGE UP (ref 150–400)
PMV BLD: 10.8 FL — SIGNIFICANT CHANGE UP (ref 7–13)
POTASSIUM SERPL-MCNC: 4.2 MMOL/L — SIGNIFICANT CHANGE UP (ref 3.5–5.3)
POTASSIUM SERPL-SCNC: 4.2 MMOL/L — SIGNIFICANT CHANGE UP (ref 3.5–5.3)
PREALB SERPL-MCNC: 24 MG/DL — SIGNIFICANT CHANGE UP (ref 18–38)
PROT SERPL-MCNC: 6.7 G/DL — SIGNIFICANT CHANGE UP (ref 6.6–8.7)
RBC # BLD: 4.89 M/UL — SIGNIFICANT CHANGE UP (ref 4.2–5.8)
RBC # FLD: 13.1 % — SIGNIFICANT CHANGE UP (ref 10.3–14.5)
SODIUM SERPL-SCNC: 138 MMOL/L — SIGNIFICANT CHANGE UP (ref 135–145)
WBC # BLD: 18.55 K/UL — HIGH (ref 3.8–10.5)
WBC # FLD AUTO: 18.55 K/UL — HIGH (ref 3.8–10.5)

## 2025-07-19 PROCEDURE — 85730 THROMBOPLASTIN TIME PARTIAL: CPT

## 2025-07-19 PROCEDURE — 86900 BLOOD TYPING SEROLOGIC ABO: CPT

## 2025-07-19 PROCEDURE — 82962 GLUCOSE BLOOD TEST: CPT

## 2025-07-19 PROCEDURE — 97163 PT EVAL HIGH COMPLEX 45 MIN: CPT

## 2025-07-19 PROCEDURE — 87641 MR-STAPH DNA AMP PROBE: CPT

## 2025-07-19 PROCEDURE — 80053 COMPREHEN METABOLIC PANEL: CPT

## 2025-07-19 PROCEDURE — C9399: CPT

## 2025-07-19 PROCEDURE — 85027 COMPLETE CBC AUTOMATED: CPT

## 2025-07-19 PROCEDURE — 84100 ASSAY OF PHOSPHORUS: CPT

## 2025-07-19 PROCEDURE — 86901 BLOOD TYPING SEROLOGIC RH(D): CPT

## 2025-07-19 PROCEDURE — 76000 FLUOROSCOPY <1 HR PHYS/QHP: CPT

## 2025-07-19 PROCEDURE — 36415 COLL VENOUS BLD VENIPUNCTURE: CPT

## 2025-07-19 PROCEDURE — 83735 ASSAY OF MAGNESIUM: CPT

## 2025-07-19 PROCEDURE — 93970 EXTREMITY STUDY: CPT

## 2025-07-19 PROCEDURE — 87640 STAPH A DNA AMP PROBE: CPT

## 2025-07-19 PROCEDURE — C1889: CPT

## 2025-07-19 PROCEDURE — 72148 MRI LUMBAR SPINE W/O DYE: CPT

## 2025-07-19 PROCEDURE — 86850 RBC ANTIBODY SCREEN: CPT

## 2025-07-19 PROCEDURE — 85610 PROTHROMBIN TIME: CPT

## 2025-07-19 PROCEDURE — 80048 BASIC METABOLIC PNL TOTAL CA: CPT

## 2025-07-19 PROCEDURE — 72141 MRI NECK SPINE W/O DYE: CPT

## 2025-07-19 PROCEDURE — 83036 HEMOGLOBIN GLYCOSYLATED A1C: CPT

## 2025-07-19 PROCEDURE — 88304 TISSUE EXAM BY PATHOLOGIST: CPT

## 2025-07-19 PROCEDURE — 93005 ELECTROCARDIOGRAM TRACING: CPT

## 2025-07-19 PROCEDURE — 72146 MRI CHEST SPINE W/O DYE: CPT

## 2025-07-19 PROCEDURE — 84134 ASSAY OF PREALBUMIN: CPT

## 2025-07-19 PROCEDURE — 99232 SBSQ HOSP IP/OBS MODERATE 35: CPT

## 2025-07-19 RX ORDER — B1/B2/B3/B5/B6/B12/VIT C/FOLIC 500-0.5 MG
1 TABLET ORAL
Qty: 30 | Refills: 0
Start: 2025-07-19 | End: 2025-08-17

## 2025-07-19 RX ORDER — SENNOSIDES, DOCUSATE SODIUM 8.6; 5 MG/1; MG/1
2 TABLET ORAL
Qty: 60 | Refills: 0
Start: 2025-07-19 | End: 2025-08-17

## 2025-07-19 RX ORDER — DEXAMETHASONE 0.5 MG/1
1 TABLET ORAL
Qty: 8 | Refills: 0
Start: 2025-07-19 | End: 2025-07-20

## 2025-07-19 RX ORDER — NALOXONE HYDROCHLORIDE 0.4 MG/ML
1 INJECTION, SOLUTION INTRAMUSCULAR; INTRAVENOUS; SUBCUTANEOUS
Qty: 1 | Refills: 0
Start: 2025-07-19

## 2025-07-19 RX ORDER — ACETAMINOPHEN 500 MG/5ML
3 LIQUID (ML) ORAL
Qty: 60 | Refills: 0
Start: 2025-07-19

## 2025-07-19 RX ORDER — OXYCODONE HYDROCHLORIDE 30 MG/1
1 TABLET ORAL
Qty: 42 | Refills: 0
Start: 2025-07-19 | End: 2025-07-25

## 2025-07-19 RX ORDER — METHOCARBAMOL 500 MG/1
1 TABLET, FILM COATED ORAL
Qty: 90 | Refills: 0
Start: 2025-07-19 | End: 2025-08-17

## 2025-07-19 RX ADMIN — Medication 500 MILLIGRAM(S): at 11:44

## 2025-07-19 RX ADMIN — DEXAMETHASONE 4 MILLIGRAM(S): 0.5 TABLET ORAL at 00:52

## 2025-07-19 RX ADMIN — Medication 2000 MILLIGRAM(S): at 03:49

## 2025-07-19 RX ADMIN — Medication 3 MILLILITER(S): at 06:02

## 2025-07-19 RX ADMIN — DEXAMETHASONE 4 MILLIGRAM(S): 0.5 TABLET ORAL at 12:47

## 2025-07-19 RX ADMIN — POLYETHYLENE GLYCOL 3350 17 GRAM(S): 17 POWDER, FOR SOLUTION ORAL at 11:44

## 2025-07-19 RX ADMIN — Medication 1 APPLICATION(S): at 11:45

## 2025-07-19 RX ADMIN — Medication 1 TABLET(S): at 11:44

## 2025-07-19 RX ADMIN — Medication 15 MILLILITER(S): at 06:30

## 2025-07-19 RX ADMIN — Medication 2000 MILLIGRAM(S): at 11:45

## 2025-07-19 RX ADMIN — DIAZEPAM 5 MILLIGRAM(S): 5 TABLET ORAL at 00:52

## 2025-07-19 RX ADMIN — INSULIN LISPRO 2: 100 INJECTION, SOLUTION INTRAVENOUS; SUBCUTANEOUS at 11:43

## 2025-07-19 RX ADMIN — DEXAMETHASONE 4 MILLIGRAM(S): 0.5 TABLET ORAL at 06:30

## 2025-07-19 RX ADMIN — Medication 40 MILLIGRAM(S): at 06:30

## 2025-07-21 PROBLEM — Z78.9 OTHER SPECIFIED HEALTH STATUS: Chronic | Status: ACTIVE | Noted: 2025-07-16

## 2025-07-23 LAB — SURGICAL PATHOLOGY STUDY: SIGNIFICANT CHANGE UP

## 2025-07-25 RX ORDER — GABAPENTIN 300 MG/1
300 CAPSULE ORAL 3 TIMES DAILY
Qty: 60 | Refills: 1 | Status: ACTIVE | COMMUNITY
Start: 2025-07-25 | End: 1900-01-01

## 2025-07-29 ENCOUNTER — APPOINTMENT (OUTPATIENT)
Dept: NEUROSURGERY | Facility: CLINIC | Age: 50
End: 2025-07-29
Payer: COMMERCIAL

## 2025-07-29 VITALS
SYSTOLIC BLOOD PRESSURE: 107 MMHG | TEMPERATURE: 98.6 F | OXYGEN SATURATION: 97 % | WEIGHT: 202 LBS | HEART RATE: 85 BPM | DIASTOLIC BLOOD PRESSURE: 72 MMHG | HEIGHT: 69 IN | BODY MASS INDEX: 29.92 KG/M2

## 2025-07-29 DIAGNOSIS — M54.16 RADICULOPATHY, LUMBAR REGION: ICD-10-CM

## 2025-07-29 PROCEDURE — 99024 POSTOP FOLLOW-UP VISIT: CPT

## 2025-08-01 RX ORDER — IBUPROFEN 200 MG
1 TABLET ORAL
Qty: 20 | Refills: 0
Start: 2025-08-01 | End: 2025-08-05

## 2025-08-07 ENCOUNTER — NON-APPOINTMENT (OUTPATIENT)
Age: 50
End: 2025-08-07

## 2025-08-07 ENCOUNTER — APPOINTMENT (OUTPATIENT)
Dept: NEUROSURGERY | Facility: CLINIC | Age: 50
End: 2025-08-07
Payer: COMMERCIAL

## 2025-08-07 VITALS
HEART RATE: 68 BPM | WEIGHT: 203 LBS | SYSTOLIC BLOOD PRESSURE: 116 MMHG | HEIGHT: 69 IN | DIASTOLIC BLOOD PRESSURE: 73 MMHG | OXYGEN SATURATION: 98 % | TEMPERATURE: 97.4 F | BODY MASS INDEX: 30.07 KG/M2

## 2025-08-07 PROCEDURE — 99024 POSTOP FOLLOW-UP VISIT: CPT

## 2025-08-07 RX ORDER — METHYLPREDNISOLONE 4 MG/1
4 TABLET ORAL
Qty: 1 | Refills: 0 | Status: DISCONTINUED | COMMUNITY
Start: 2025-07-29 | End: 2025-08-07

## 2025-08-07 RX ORDER — BUSPIRONE HYDROCHLORIDE 7.5 MG/1
TABLET ORAL
Refills: 0 | Status: ACTIVE | COMMUNITY

## 2025-08-28 ENCOUNTER — APPOINTMENT (OUTPATIENT)
Dept: NEUROSURGERY | Facility: CLINIC | Age: 50
End: 2025-08-28
Payer: COMMERCIAL

## 2025-08-28 VITALS
BODY MASS INDEX: 29.62 KG/M2 | HEART RATE: 86 BPM | SYSTOLIC BLOOD PRESSURE: 110 MMHG | OXYGEN SATURATION: 98 % | HEIGHT: 69 IN | DIASTOLIC BLOOD PRESSURE: 73 MMHG | WEIGHT: 200 LBS | TEMPERATURE: 98.1 F

## 2025-08-28 DIAGNOSIS — M54.16 RADICULOPATHY, LUMBAR REGION: ICD-10-CM

## 2025-08-28 PROCEDURE — 99024 POSTOP FOLLOW-UP VISIT: CPT

## 2025-09-09 ENCOUNTER — APPOINTMENT (OUTPATIENT)
Dept: NEUROSURGERY | Facility: CLINIC | Age: 50
End: 2025-09-09
Payer: COMMERCIAL

## 2025-09-09 DIAGNOSIS — M54.16 RADICULOPATHY, LUMBAR REGION: ICD-10-CM

## 2025-09-09 PROCEDURE — 99024 POSTOP FOLLOW-UP VISIT: CPT

## (undated) DEVICE — SUT MONOCRYL 4-0 27" PS-2 UNDYED

## (undated) DEVICE — POSITIONER JACKSON TABLE HEADREST 7", CHEST, HIP, THIGH PADS, ARM CRADLE

## (undated) DEVICE — DRAPE INSTRUMENT POUCH 6.75" X 11"

## (undated) DEVICE — DRSG MEPILEX 10 X 10CM (4 X 4") AG

## (undated) DEVICE — DRAIN JACKSON PRATT 3 SPRING RESERVOIR W 7FR PVC DRAIN

## (undated) DEVICE — Device

## (undated) DEVICE — POSITIONER FOAM EGG CRATE ULNAR 2PCS (PINK)

## (undated) DEVICE — DRAPE SPLIT SHEET 77" X 108"

## (undated) DEVICE — FOLEY TRAY 16FR LF URINE METER SURESTEP

## (undated) DEVICE — ELCTR BOVIE TIP BLADE INSULATED 2.75" EDGE

## (undated) DEVICE — MARKING PEN W RULER

## (undated) DEVICE — SOL IRR POUR NS 0.9% 1000ML

## (undated) DEVICE — SUT VICRYL PLUS 2-0 18" CP-2 UNDYED (POP-OFF)

## (undated) DEVICE — ELCTR BOVIE PENCIL BLADE 10FT

## (undated) DEVICE — FRAZIER CONNECTING TUBE 2FT 5MM

## (undated) DEVICE — ELCTR SUBDERMAL NDL 27G X 1/2" WITH TWISTED PAIR

## (undated) DEVICE — WARMING BLANKET UPPER ADULT

## (undated) DEVICE — ELCTR PEDICLE SCREW PROBE 3MM BALL 1.8MM X 100MM

## (undated) DEVICE — SOL IRR POUR H2O 1000ML

## (undated) DEVICE — MIDAS REX MR8 MATCH HEAD FLUTED SM BORE 3MM X 10CM

## (undated) DEVICE — ELCTR SUBDERMAL NDL CLASSIC 1.5M X 59" (6 COLOR)

## (undated) DEVICE — POSITIONER FOAM LAMINECTOMY ARM CRADLE (PINK)

## (undated) DEVICE — DRAPE 3/4 SHEET 52X76"

## (undated) DEVICE — ELCTR 4-DISC 20MM 49" (RED, BLUE, GREEN, BLACK)

## (undated) DEVICE — SUT VICRYL PLUS 0 18" CT-1 UNDYED (POP-OFF)

## (undated) DEVICE — DRSG TELFA 3 X 4

## (undated) DEVICE — VENODYNE/SCD SLEEVE CALF MEDIUM

## (undated) DEVICE — DRAPE C ARM UNIVERSAL

## (undated) DEVICE — DRAIN JACKSON PRATT 7MM FLAT FULL W 15 FR TROCAR

## (undated) DEVICE — ELCTR MONOPOLAR STIMULATOR PROBE FLUSH-TIP

## (undated) DEVICE — ELCTR BIPOLAR PROBE

## (undated) DEVICE — PACK NEURO

## (undated) DEVICE — DRAPE TOWEL BLUE 17" X 24"

## (undated) DEVICE — ELCTR ROCKER SWITCH PENCIL BLUE 10FT

## (undated) DEVICE — BIPOLAR FORCEP SYMMETRY BAYONET 7" X 1.5MM SMOOTH (SILVER)

## (undated) DEVICE — ELCTR GROUNDING PAD ADULT COVIDIEN

## (undated) DEVICE — DRAPE XL SHEET 77X98"

## (undated) DEVICE — DRSG BIOPATCH DISK W CHG 1" W 7.0MM HOLE